# Patient Record
Sex: MALE | Race: OTHER | HISPANIC OR LATINO | Employment: PART TIME | URBAN - METROPOLITAN AREA
[De-identification: names, ages, dates, MRNs, and addresses within clinical notes are randomized per-mention and may not be internally consistent; named-entity substitution may affect disease eponyms.]

---

## 2018-10-30 ENCOUNTER — HOSPITAL ENCOUNTER (EMERGENCY)
Facility: HOSPITAL | Age: 28
Discharge: HOME | End: 2018-10-30
Attending: EMERGENCY MEDICINE | Admitting: EMERGENCY MEDICINE

## 2018-10-30 ENCOUNTER — APPOINTMENT (EMERGENCY)
Dept: RADIOLOGY | Facility: HOSPITAL | Age: 28
End: 2018-10-30

## 2018-10-30 VITALS
SYSTOLIC BLOOD PRESSURE: 130 MMHG | RESPIRATION RATE: 19 BRPM | OXYGEN SATURATION: 99 % | HEART RATE: 73 BPM | DIASTOLIC BLOOD PRESSURE: 68 MMHG | TEMPERATURE: 99.5 F | HEIGHT: 73 IN | WEIGHT: 203.6 LBS | BODY MASS INDEX: 26.98 KG/M2

## 2018-10-30 DIAGNOSIS — E87.6 HYPOKALEMIA: ICD-10-CM

## 2018-10-30 DIAGNOSIS — R50.9 FEVER: Primary | ICD-10-CM

## 2018-10-30 DIAGNOSIS — D69.6 THROMBOCYTOPENIA (HCC): ICD-10-CM

## 2018-10-30 DIAGNOSIS — B34.9 VIRAL SYNDROME: ICD-10-CM

## 2018-10-30 LAB
ANION GAP SERPL CALCULATED.3IONS-SCNC: 9 MMOL/L (ref 5–14)
BACTERIA UR QL AUTO: ABNORMAL /HPF
BASOPHILS # BLD AUTO: 0 THOUSANDS/ΜL (ref 0–0.1)
BASOPHILS NFR BLD AUTO: 1 % (ref 0–1)
BILIRUB UR QL STRIP: NEGATIVE
BUN SERPL-MCNC: 11 MG/DL (ref 5–25)
CALCIUM SERPL-MCNC: 8.3 MG/DL (ref 8.4–10.2)
CHLORIDE SERPL-SCNC: 97 MMOL/L (ref 97–108)
CLARITY UR: CLEAR
CO2 SERPL-SCNC: 26 MMOL/L (ref 22–30)
COLOR UR: ABNORMAL
CREAT SERPL-MCNC: 0.98 MG/DL (ref 0.7–1.5)
EOSINOPHIL # BLD AUTO: 0 THOUSAND/ΜL (ref 0–0.4)
EOSINOPHIL NFR BLD AUTO: 0 % (ref 0–6)
ERYTHROCYTE [DISTWIDTH] IN BLOOD BY AUTOMATED COUNT: 13 %
FLUAV + FLUBV RNA ISLT NAA+PROBE: NOT DETECTED
FLUAV + FLUBV RNA ISLT NAA+PROBE: NOT DETECTED
GFR SERPL CREATININE-BSD FRML MDRD: 123 ML/MIN/1.73SQ M
GLUCOSE SERPL-MCNC: 126 MG/DL (ref 70–99)
GLUCOSE SERPL-MCNC: 128 MG/DL (ref 70–99)
GLUCOSE UR STRIP-MCNC: NEGATIVE MG/DL
HCT VFR BLD AUTO: 50.5 % (ref 41–53)
HGB BLD-MCNC: 17.2 G/DL (ref 13.5–17.5)
HGB UR QL STRIP.AUTO: NEGATIVE
KETONES UR STRIP-MCNC: ABNORMAL MG/DL
LEUKOCYTE ESTERASE UR QL STRIP: NEGATIVE
LYMPHOCYTES # BLD AUTO: 0.6 THOUSANDS/ΜL (ref 0.5–4)
LYMPHOCYTES NFR BLD AUTO: 20 % (ref 20–50)
MAGNESIUM SERPL-MCNC: 1.7 MG/DL (ref 1.6–2.3)
MCH RBC QN AUTO: 30.6 PG (ref 26–34)
MCHC RBC AUTO-ENTMCNC: 34.1 G/DL (ref 31–36)
MCV RBC AUTO: 90 FL (ref 80–100)
MONOCYTES # BLD AUTO: 0.2 THOUSAND/ΜL (ref 0.2–0.9)
MONOCYTES NFR BLD AUTO: 8 % (ref 1–10)
MUCOUS THREADS UR QL AUTO: ABNORMAL
NEUTROPHILS # BLD AUTO: 2.3 THOUSANDS/ΜL (ref 1.8–7.8)
NEUTS SEG NFR BLD AUTO: 71 % (ref 45–65)
NITRITE UR QL STRIP: NEGATIVE
NON-SQ EPI CELLS URNS QL MICRO: ABNORMAL /HPF
PH UR STRIP.AUTO: 6 [PH] (ref 4.5–8)
PLATELET # BLD AUTO: 86 THOUSANDS/UL (ref 150–450)
PLATELET BLD QL SMEAR: ABNORMAL
PMV BLD AUTO: 11.1 FL (ref 8.9–12.7)
POTASSIUM SERPL-SCNC: 3.2 MMOL/L (ref 3.6–5)
PROT UR STRIP-MCNC: ABNORMAL MG/DL
RBC # BLD AUTO: 5.61 MILLION/UL (ref 4.5–5.9)
RBC #/AREA URNS AUTO: ABNORMAL /HPF
RBC MORPH BLD: NORMAL
SODIUM SERPL-SCNC: 132 MMOL/L (ref 137–147)
SP GR UR STRIP.AUTO: 1.02 (ref 1–1.04)
UROBILINOGEN UA: 1 MG/DL
WBC # BLD AUTO: 3.2 THOUSAND/UL (ref 4.5–11)
WBC #/AREA URNS AUTO: ABNORMAL /HPF

## 2018-10-30 PROCEDURE — 81001 URINALYSIS AUTO W/SCOPE: CPT | Performed by: EMERGENCY MEDICINE

## 2018-10-30 PROCEDURE — 36415 COLL VENOUS BLD VENIPUNCTURE: CPT | Performed by: EMERGENCY MEDICINE

## 2018-10-30 PROCEDURE — 80048 BASIC METABOLIC PNL TOTAL CA: CPT | Performed by: EMERGENCY MEDICINE

## 2018-10-30 PROCEDURE — 85025 COMPLETE CBC W/AUTO DIFF WBC: CPT | Performed by: EMERGENCY MEDICINE

## 2018-10-30 PROCEDURE — 82948 REAGENT STRIP/BLOOD GLUCOSE: CPT

## 2018-10-30 PROCEDURE — 71046 X-RAY EXAM CHEST 2 VIEWS: CPT

## 2018-10-30 PROCEDURE — 83735 ASSAY OF MAGNESIUM: CPT | Performed by: EMERGENCY MEDICINE

## 2018-10-30 PROCEDURE — 96374 THER/PROPH/DIAG INJ IV PUSH: CPT

## 2018-10-30 PROCEDURE — 99284 EMERGENCY DEPT VISIT MOD MDM: CPT

## 2018-10-30 PROCEDURE — 87502 INFLUENZA DNA AMP PROBE: CPT | Performed by: EMERGENCY MEDICINE

## 2018-10-30 PROCEDURE — 96361 HYDRATE IV INFUSION ADD-ON: CPT

## 2018-10-30 RX ORDER — POTASSIUM CHLORIDE 750 MG/1
20 TABLET, EXTENDED RELEASE ORAL ONCE
Status: COMPLETED | OUTPATIENT
Start: 2018-10-30 | End: 2018-10-30

## 2018-10-30 RX ORDER — IBUPROFEN 600 MG/1
600 TABLET ORAL ONCE
Status: DISCONTINUED | OUTPATIENT
Start: 2018-10-30 | End: 2018-10-30

## 2018-10-30 RX ORDER — POTASSIUM CHLORIDE 750 MG/1
TABLET, EXTENDED RELEASE ORAL
Status: COMPLETED
Start: 2018-10-30 | End: 2018-10-30

## 2018-10-30 RX ORDER — KETOROLAC TROMETHAMINE 30 MG/ML
INJECTION, SOLUTION INTRAMUSCULAR; INTRAVENOUS
Status: COMPLETED
Start: 2018-10-30 | End: 2018-10-30

## 2018-10-30 RX ORDER — ACETAMINOPHEN 325 MG/1
650 TABLET ORAL ONCE
Status: COMPLETED | OUTPATIENT
Start: 2018-10-30 | End: 2018-10-30

## 2018-10-30 RX ORDER — KETOROLAC TROMETHAMINE 30 MG/ML
30 INJECTION, SOLUTION INTRAMUSCULAR; INTRAVENOUS ONCE
Status: DISCONTINUED | OUTPATIENT
Start: 2018-10-30 | End: 2018-10-30

## 2018-10-30 RX ORDER — KETOROLAC TROMETHAMINE 30 MG/ML
30 INJECTION, SOLUTION INTRAMUSCULAR; INTRAVENOUS ONCE
Status: COMPLETED | OUTPATIENT
Start: 2018-10-30 | End: 2018-10-30

## 2018-10-30 RX ORDER — ACETAMINOPHEN 325 MG/1
TABLET ORAL
Status: COMPLETED
Start: 2018-10-30 | End: 2018-10-30

## 2018-10-30 RX ADMIN — POTASSIUM CHLORIDE 20 MEQ: 750 TABLET, EXTENDED RELEASE ORAL at 22:18

## 2018-10-30 RX ADMIN — KETOROLAC TROMETHAMINE 30 MG: 30 INJECTION, SOLUTION INTRAMUSCULAR; INTRAVENOUS at 21:18

## 2018-10-30 RX ADMIN — ACETAMINOPHEN 650 MG: 325 TABLET ORAL at 21:17

## 2018-10-30 RX ADMIN — SODIUM CHLORIDE 1000 ML: 9 INJECTION, SOLUTION INTRAVENOUS at 21:24

## 2018-10-30 RX ADMIN — POTASSIUM CHLORIDE 20 MEQ: 10 TABLET, EXTENDED RELEASE ORAL at 22:18

## 2018-10-31 NOTE — ED PROVIDER NOTES
History  Chief Complaint   Patient presents with    Fatigue     patient states that he feels weakness in bilateral legs, started yesterday, patient states that he also feels like his mouth is dry, like he is dehydrated, hasn't been able to eat x2 days, drinking liquids  no meds taken  denies ill contact     Patient is a 71-year-old male otherwise healthy coming in today for 2 days viral-like syndrome  Patient states he started with a nonproductive cough  Now has been having subjective fevers, myalgias, weakness and paresthesias throughout the bilateral lower extremities  He has no headache, confusion, slurred speech  He has no weakness or paresthesias throughout the bilateral upper extremities  He has no recent travel, sick contacts, he did not receive the flu shot  He has never had this before  He states he does not have an appetite  He has been tolerating Gatorade and liquids without any difficulty  He has no nausea, vomiting or diarrhea          History provided by:  Patient   used: No    Fatigue   Severity:  Mild  Onset quality:  Gradual  Duration:  2 days  Timing:  Intermittent  Progression:  Waxing and waning  Chronicity:  New  Context: dehydration    Context: not alcohol use, not allergies, not change in medication, not decreased sleep, not drug use, not increased activity, not pinched nerve, not recent infection, not stress and not urinary tract infection    Worsened by:  Nothing  Ineffective treatments:  None tried  Associated symptoms: anorexia, fever and myalgias    Associated symptoms: no abdominal pain, no aphasia, no arthralgias, no ataxia, no chest pain, no cough, no diarrhea, no difficulty walking, no dizziness, no drooling, no dysphagia, no dysuria, no falls, no foul-smelling urine, no frequency, no headaches, no hematochezia, no lethargy, no loss of consciousness, no melena, no nausea, no near-syncope, no seizures, no sensory-motor deficit, no shortness of breath, no stroke symptoms, no syncope, no urgency, no vision change and no vomiting    Risk factors: no anemia, no congestive heart failure, no coronary artery disease, no diabetes, no excessive menstruation, no family hx of stroke, no heart disease, no neurologic disease, no new medications and no recent stressors        None       Past Medical History:   Diagnosis Date    Asthma        History reviewed  No pertinent surgical history  History reviewed  No pertinent family history  I have reviewed and agree with the history as documented  Social History   Substance Use Topics    Smoking status: Current Every Day Smoker     Packs/day: 0 25     Types: Cigarettes    Smokeless tobacco: Never Used    Alcohol use Yes      Comment: weekends         Review of Systems   Constitutional: Positive for fatigue and fever  Negative for diaphoresis  HENT: Negative for drooling, ear pain and sore throat  Eyes: Negative for visual disturbance  Respiratory: Negative for cough, chest tightness and shortness of breath  Cardiovascular: Negative for chest pain, palpitations, syncope and near-syncope  Gastrointestinal: Positive for anorexia  Negative for abdominal pain, diarrhea, dysphagia, hematochezia, melena, nausea and vomiting  Genitourinary: Negative for difficulty urinating, dysuria, frequency and urgency  Musculoskeletal: Positive for myalgias  Negative for arthralgias, back pain, falls and neck pain  Skin: Negative for rash  Neurological: Negative for dizziness, seizures, loss of consciousness, weakness and headaches  Psychiatric/Behavioral: Negative for confusion  All other systems reviewed and are negative  Physical Exam  Physical Exam   Constitutional: He is oriented to person, place, and time  He appears well-developed and well-nourished  No distress  HENT:   Head: Normocephalic and atraumatic  Mouth/Throat: Oropharynx is clear and moist    Patient is maintaining airway maintaining secretions  Uvula midline without edema  Tongue midline  No pharyngeal exudates  Eyes: Pupils are equal, round, and reactive to light  Conjunctivae and EOM are normal    Neck: Normal range of motion  Neck supple  No cervical adenopathy   Cardiovascular: Regular rhythm, normal heart sounds and intact distal pulses  Tachycardia present  No murmur heard  Pulmonary/Chest: Effort normal and breath sounds normal  No stridor  No respiratory distress  Abdominal: Soft  Bowel sounds are normal  He exhibits no distension and no mass  There is no tenderness  There is no rebound and no guarding  Musculoskeletal: Normal range of motion  He exhibits no edema  Patient has full active range of motion throughout the bilateral lower extremities  Neurological: He is alert and oriented to person, place, and time  He displays normal reflexes  No cranial nerve deficit or sensory deficit  He exhibits normal muscle tone  Coordination normal    Reflexes 2+ at L4 and S1 bilaterally   Skin: Skin is warm  Capillary refill takes less than 2 seconds  He is not diaphoretic  Nursing note and vitals reviewed        Vital Signs  ED Triage Vitals   Temperature Pulse Respirations Blood Pressure SpO2   10/30/18 2047 10/30/18 2047 10/30/18 2047 10/30/18 2047 10/30/18 2047   (!) 101 9 °F (38 8 °C) 101 18 113/75 98 %      Temp Source Heart Rate Source Patient Position - Orthostatic VS BP Location FiO2 (%)   10/30/18 2047 10/30/18 2047 10/30/18 2047 10/30/18 2047 --   Tympanic Monitor Sitting Left arm       Pain Score       10/30/18 2117       9           Vitals:    10/30/18 2047 10/30/18 2215 10/30/18 2251   BP: 113/75  130/68   Pulse: 101 90 73   Patient Position - Orthostatic VS: Sitting  Lying       Visual Acuity      ED Medications  Medications   acetaminophen (TYLENOL) tablet 650 mg (650 mg Oral Given 10/30/18 2117)   sodium chloride 0 9 % bolus 1,000 mL (0 mL Intravenous Stopped 10/30/18 2215)   ketorolac (TORADOL) injection 30 mg (30 mg Intravenous Given 10/30/18 2118)   potassium chloride (K-DUR,KLOR-CON) CR tablet 20 mEq (20 mEq Oral Given 10/30/18 2218)       Diagnostic Studies  Results Reviewed     Procedure Component Value Units Date/Time    Rapid Flu-Viral RNA amplification [facility] HEART ONLY) [[id number]]  (Normal) Collected:  10/30/18 2122    Lab Status:  Final result Specimen:  Nares from Nose Updated:  10/30/18 2158     INFLUENZA A AMPLIFIED RNA Not Detected     INFLUENZA B AMPLIFIED Not Detected    Urine Microscopic [[id number]]  (Abnormal) Collected:  10/30/18 2122    Lab Status:  Final result Specimen:  Urine from Urine, Clean Catch Updated:  10/30/18 2154     RBC, UA 0-1 (A) /hpf      WBC, UA 2-4 (A) /hpf      Epithelial Cells Occasional /hpf      Bacteria, UA Occasional /hpf      MUCOUS THREADS Occasional (A)    Basic metabolic panel [[id number]]  (Abnormal) Collected:  10/30/18 2121    Lab Status:  Final result Specimen:  Blood from Arm, Left Updated:  10/30/18 2149     Sodium 132 (L) mmol/L      Potassium 3 2 (L) mmol/L      Chloride 97 mmol/L      CO2 26 mmol/L      ANION GAP 9 mmol/L      BUN 11 mg/dL      Creatinine 0 98 mg/dL      Glucose 126 (H) mg/dL      Calcium 8 3 (L) mg/dL      eGFR 123 ml/min/1 73sq m     Narrative:         National Kidney Disease Education Program recommendations are as follows:  GFR calculation is accurate only with a steady state creatinine  Chronic Kidney disease less than 60 ml/min/1 73 sq  meters  Kidney failure less than 15 ml/min/1 73 sq  meters      CBC and differential [[id number]]  (Abnormal) Collected:  10/30/18 2121    Lab Status:  Final result Specimen:  Blood from Arm, Left Updated:  10/30/18 2149     WBC 3 20 (L) Thousand/uL      RBC 5 61 Million/uL      Hemoglobin 17 2 g/dL      Hematocrit 50 5 %      MCV 90 fL      MCH 30 6 pg      MCHC 34 1 g/dL      RDW 13 0 %      MPV 11 1 fL      Platelets 86 (L) Thousands/uL      Neutrophils Relative 71 (H) %      Lymphocytes Relative 20 %      Monocytes Relative 8 %      Eosinophils Relative 0 %      Basophils Relative 1 %      Neutrophils Absolute 2 30 Thousands/µL      Lymphocytes Absolute 0 60 Thousands/µL      Monocytes Absolute 0 20 Thousand/µL      Eosinophils Absolute 0 00 Thousand/µL      Basophils Absolute 0 00 Thousands/µL     Magnesium [[id number]]  (Normal) Collected:  10/30/18 2121    Lab Status:  Final result Specimen:  Blood from Arm, Left Updated:  10/30/18 2148     Magnesium 1 7 mg/dL     UA w Reflex to Microscopic [[id number]]  (Abnormal) Collected:  10/30/18 2122    Lab Status:  Final result Specimen:  Urine from Urine, Clean Catch Updated:  10/30/18 2142     Color, UA Trudi (A)     Clarity, UA Clear     Specific [place], UA 1 020     pH, UA 6 0     Leukocytes, UA Negative     Nitrite, UA Negative     Protein,  (2+) (A) mg/dl      Glucose, UA Negative mg/dl      Ketones, UA 50 (2+) (A) mg/dl      Bilirubin, UA Negative     Blood, UA Negative     UROBILINOGEN UA 1 0 mg/dL     Fingerstick Glucose (POCT) [[id number]]  (Abnormal) Collected:  10/30/18 2053    Lab Status:  Final result Updated:  10/30/18 2104     POC Glucose 128 (H) mg/dl                  XR chest 2 views    (Results Pending)              Procedures  Procedures       Phone Contacts  ED Phone Contact    ED Course                               MDM  Number of Diagnoses or Management Options  Fever:   Hypokalemia:   Thrombocytopenia ([facility] [address] ):   Viral syndrome:   Diagnosis management comments:   Patient is a 81-year-old male with subjective fevers and does have a fever here  He is complaining of myalgias as well as tachycardia  Will obtain labs, flu swab as well as give IV fluids as well as Tylenol and Toradol  Patient is neurologically intact with no focal deficits  10:08 PM  Patient with noted hypokalemia, thrombocytopenia and neutropenia  Patient remains febrile however appears very exam to the breaking his temperature/fever  Remains hemodynamically stable    Patient updated on labs including thrombocytopenia as well as hypokalemia  Will replace K  I discussed with him need for chest x-ray as well as need for follow-up with PCP and repeat laboratory in the next few days  Patient denies any exposure to HIV or hepatitis  10:54 PM  Patient afebrile hemodynamically stable  We discussed with him the importance of following up with a PCP for repeat lab work  He is asking for IV out to go home  He tolerated p o  well  Remains in no apparent distress on my exam   Flu testing negative  Portions of the record may have been created with voice recognition software  Occasional wrong word or "sound a like" substitutions may have occurred due to the inherent limitations of voice recognition software  Read the chart carefully and recognize, using context, where substitutions have occurred  Amount and/or Complexity of Data Reviewed  Clinical lab tests: ordered and reviewed  Tests in the radiology section of CPT®: reviewed and ordered  Tests in the medicine section of CPT®: reviewed and ordered  Independent visualization of images, tracings, or specimens: yes (No focal consolidation  Cardiac silhouette stable  No pneumothoraces)      CritCare Time    Disposition  Final diagnoses:   Fever   Thrombocytopenia ([address] )   Hypokalemia   Viral syndrome     Time reflects when diagnosis was documented in both MDM as applicable and the Disposition within this note     Time User Action Codes Description Comment    10/30/2018 10:11 PM Bendjessica, Chelle L Add [R50 9] Fever     10/30/2018 10:11 PM Bendjessica Chelle L Add [D69 6] Thrombocytopenia ([address] )     10/30/2018 10:11 PM BendEstrella lopez [place] Add [E87 6] Hypokalemia     10/30/2018 10:27 PM BendMehul lopez L Add [B34 9] Viral syndrome       ED Disposition     ED Disposition Condition Comment    Discharge  Corey Colon discharge to home/self care      Condition at discharge: Stable        Follow-up Information     Follow up With Specialties Details Why Contact Info [address] Schedule an appointment as soon as possible for a visit in 2 days  [address]  [facility] [address]             Patient's Medications    No medications on file     No discharge procedures on file      ED Provider  Electronically Signed by           Augustina Jose DO  10/30/18 7759

## 2018-10-31 NOTE — DISCHARGE INSTRUCTIONS
You must follow up with family doctor and have your labs repeated  You have low platelets which could be due to virus however this needs to be followed    Fever in [facility]:   What is a fever? A fever is an increase in your body temperature  Normal body temperature is 98 6°F (37°C)  Fever is generally defined as greater than 100 4°F (38°C)  What are common causes of a fever? The cause of your fever may not be known  This is called fever of unknown origin  It occurs when you have a fever above 100  9? F (38 3°C) for 3 weeks or more  The following are common causes of fever:  · An infection caused by a virus or bacteria    · An inflammatory disorder, such as arthritis    · A brain infection or injury    · Alcohol or illegal drug use, or withdrawal  What other signs and symptoms may I have? · Chills and shivers     · Muscle stiffness    · Weight loss    · Night sweats    · Fever that comes and goes  · Fever that is higher in the morning  How is the cause of a fever diagnosed? Your healthcare provider will ask when your fever began and how high it was  He or she will ask about other symptoms and examine you for signs of infection  He or she will feel your neck for lumps and listen to your heart and lungs  Tell your provider if you recently had surgery or an infection  Tell him or her if you have any medical conditions, such as diabetes or arthritis  You may also need blood or urine tests to check for infection  Ask about other tests you may need if blood and urine tests do not explain the cause of your fever  How is a fever treated? You may need any of the following, depending on the cause of your fever:  · NSAIDs , such as ibuprofen, help decrease swelling, pain, and fever  This medicine is available with or without a doctor's order  NSAIDs can cause stomach bleeding or kidney problems in certain people  If you take blood thinner medicine, always ask if NSAIDs are safe for you   Always read the medicine label and follow directions  Do not give these medicines to children under 10months of age without direction from your child's healthcare provider  · Acetaminophen  decreases pain and fever  It is available without a doctor's order  Ask how much to take and how often to take it  Follow directions  Read the labels of all other medicines you are using to see if they also contain acetaminophen, or ask your doctor or pharmacist  Acetaminophen can cause liver damage if not taken correctly  Do not use more than 4 grams (4,000 milligrams) total of acetaminophen in one day  · Antibiotics  may be given if you have an infection caused by bacteria  What can I do to be more comfortable while I have a fever? · Drink more liquids as directed  A fever makes you sweat  This can increase your risk for dehydration  Liquids can help prevent dehydration  ¨ Drink at least 6 to 8 eight-ounce cups of clear liquids each day  Drink water, juice, or broth  Do not drink sports drinks  They may contain caffeine  ¨ Ask your healthcare provider if you should drink an oral rehydration solution (ORS)  An ORS has the right amounts of water, salts, and sugar you need to replace body fluids  · Dress in lightweight clothes  Shivers may be a sign that your fever is rising  Do not put extra blankets or clothes on  This may cause your fever to rise even higher  Dress in light, comfortable clothing  Use a lightweight blanket or sheet when you sleep  Change your clothes, blanket, or sheets if they get wet  · Cool yourself safely  Take a bath in cool or lukewarm water  Use an ice pack wrapped in a small towel or wet a washcloth with cool water  Place the ice pack or wet washcloth on your forehead or the back of your neck  When should I seek immediate care? · Your fever does not go away or gets worse even after treatment  · You have a stiff neck and a bad headache  · You are confused   You may not be able to think clearly or remember things like you normally do  · Your heart beats faster than usual even after treatment  · You have shortness of breath or chest pain when you breathe  · You urinate small amounts or not at all  · Your skin, lips, or nails turn blue  When should I contact my healthcare provider? · You have abdominal pain or feel bloated  · You have nausea or are vomiting  · You have pain or burning when you urinate, or you have pain in your back  · You have questions or concerns about your condition or care  CARE AGREEMENT:   You have the right to help plan your care  Learn about your health condition and how it may be treated  Discuss treatment options with your caregivers to decide what care you want to receive  You always have the right to refuse treatment  The above information is an  only  It is not intended as medical advice for individual conditions or treatments  Talk to your doctor, nurse or pharmacist before following any medical regimen to see if it is safe and effective for you  © 2017 [address] Information is for End User's use only and may not be sold, redistributed or otherwise used for commercial purposes  All illustrations and images included in CareNotes® are the copyrighted property of [employer]  or [employer]  Hypokalemia   WHAT YOU NEED TO KNOW:   Hypokalemia is a low level of potassium in your blood  Potassium helps control how your muscles, heart, and digestive system work  Hypokalemia occurs when your body loses too much potassium or does not absorb enough from food  DISCHARGE INSTRUCTIONS:   Seek care immediately if:   · You cannot move your arm or leg  · You have a fast or irregular heartbeat  · You are too tired or weak to stand up  Contact your healthcare provider if:   · You are vomiting, or you have diarrhea  · You have numbness or tingling in your arms or legs      · Your symptoms do not go away or they get worse  · You have questions or concerns about your condition or care  Medicines:   · Potassium  will be given to bring your potassium levels back to normal     · Take your medicine as directed  Contact your healthcare provider if you think your medicine is not helping or if you have side effects  Tell him of her if you are allergic to any medicine  Keep a list of the medicines, vitamins, and herbs you take  Include the amounts, and when and why you take them  Bring the list or the pill bottles to follow-up visits  Carry your medicine list with you in case of an emergency  Eat foods that are high in potassium:  Foods that are high in potassium include bananas, oranges, tomatoes, potatoes, and avocado  Bond beans, turkey, salmon, lean beef, yogurt, and milk are also high in potassium  Ask your healthcare provider or dietitian for more information about foods that are high in potassium  Follow up with your healthcare provider as directed:  Write down your questions so you remember to ask them during your visits  © 2017 [address] [facility] Information is for End User's use only and may not be sold, redistributed or otherwise used for commercial purposes  All illustrations and images included in CareNotes® are the copyrighted property of [employer] A M , Inc  or [employer]  The above information is an  only  It is not intended as medical advice for individual conditions or treatments  Talk to your doctor, nurse or pharmacist before following any medical regimen to see if it is safe and effective for you  Thrombocytopenia   WHAT YOU NEED TO KNOW:   Thrombocytopenia occurs when your body does not have enough platelets  Platelets are cells that help your blood clot  Your body may not be making enough platelets, or it may be destroying too many platelets  When platelets become low, your risk for bleeding increases   Severe bleeding may become life-threatening  DISCHARGE INSTRUCTIONS:   Call 911 for any of the following:   · You have chest pain, tightness, or heaviness that spreads to your shoulders, arms, jaw, neck, or back  · You have weakness on one side of your body, a severe headache, difficulty speaking, or a change in vision  Seek care immediately if:   · You have bleeding that does not stop after you elevate and place pressure on the area  · You vomit blood or material that looks like coffee grounds  · Your arm or leg feels warm, tender, and painful  It may look swollen and red  · You suddenly feel lightheaded, dizzy, or weak  Contact your healthcare provider if:   · You have bleeding from your gums, mouth, or nose  · You have irregular or heavy menstrual bleeding  · You have blood in your urine or bowel movement  · You have more bruises or small red or purple spots on your skin  · You have questions or concerns about your condition or care  Medicines:   · Medicines  can help increase platelet production and prevent bleeding  · Take your medicine as directed  Contact your healthcare provider if you think your medicine is not helping or if you have side effects  Tell him or her if you are allergic to any medicine  Keep a list of the medicines, vitamins, and herbs you take  Include the amounts, and when and why you take them  Bring the list or the pill bottles to follow-up visits  Carry your medicine list with you in case of an emergency  Self-care to help prevent bleeding:  Examine your skin for minor bumps, scrapes, and cuts  These injuries can increase your risk for bleeding that can become life-threatening  · Use caution with skin and mouth care  Use a soft washcloth and a soft toothbrush  This can keep your skin and gums from bleeding  Keep your nails trimmed  If you shave, use an electric shaver  · Do not strain when you have a bowel movement    This can increase pressure in your brain and could cause bleeding  Ask your healthcare provider about a stool softener or laxative if you are constipated  Do not use enemas or suppositories  · Use a cool mist humidifier  to increase moisture in your home  This may help prevent coughing or nosebleeds  Coughing can increase pressure in your brain and cause bleeding  · Avoid activities that may cause scratches or bruises  Wear shoes or slippers to protect your feet from injury  Ask which activities are safe for you  · Do not take aspirin or NSAIDs  These medicines can cause you to bleed and bruise more easily  Wear medical alert identification:  Wear a medical alert bracelet or carry a card that says you have thrombocytopenia  Ask where to get these items  Follow up with your healthcare provider as directed: You will need to return for more blood tests  Write down your questions so you remember to ask them during your visits  © 2017 [address]  Information is for End User's use only and may not be sold, redistributed or otherwise used for commercial purposes  All illustrations and images included in CareNotes® are the copyrighted property of A [employer] A M , Inc  or Alcides Treviño  The above information is an  only  It is not intended as medical advice for individual conditions or treatments  Talk to your doctor, nurse or pharmacist before following any medical regimen to see if it is safe and effective for you

## 2021-02-19 ENCOUNTER — APPOINTMENT (EMERGENCY)
Dept: RADIOLOGY | Facility: HOSPITAL | Age: 31
End: 2021-02-19

## 2021-02-19 ENCOUNTER — HOSPITAL ENCOUNTER (EMERGENCY)
Facility: HOSPITAL | Age: 31
Discharge: HOME | End: 2021-02-19
Attending: EMERGENCY MEDICINE | Admitting: EMERGENCY MEDICINE

## 2021-02-19 VITALS
RESPIRATION RATE: 18 BRPM | HEART RATE: 69 BPM | DIASTOLIC BLOOD PRESSURE: 88 MMHG | TEMPERATURE: 97.9 F | SYSTOLIC BLOOD PRESSURE: 143 MMHG | OXYGEN SATURATION: 100 % | WEIGHT: 215.8 LBS | BODY MASS INDEX: 28.47 KG/M2

## 2021-02-19 DIAGNOSIS — Z20.822 ENCOUNTER FOR LABORATORY TESTING FOR COVID-19 VIRUS: ICD-10-CM

## 2021-02-19 DIAGNOSIS — B34.9 VIRAL SYNDROME: Primary | ICD-10-CM

## 2021-02-19 LAB
FLUAV RNA RESP QL NAA+PROBE: NEGATIVE
FLUBV RNA RESP QL NAA+PROBE: NEGATIVE
RSV RNA RESP QL NAA+PROBE: NEGATIVE
SARS-COV-2 RNA RESP QL NAA+PROBE: NEGATIVE

## 2021-02-19 PROCEDURE — 99283 EMERGENCY DEPT VISIT LOW MDM: CPT | Performed by: PHYSICIAN ASSISTANT

## 2021-02-19 PROCEDURE — 99283 EMERGENCY DEPT VISIT LOW MDM: CPT

## 2021-02-19 PROCEDURE — 71045 X-RAY EXAM CHEST 1 VIEW: CPT

## 2021-02-19 PROCEDURE — 0241U HB NFCT DS VIR RESP RNA 4 TRGT: CPT | Performed by: PHYSICIAN ASSISTANT

## 2021-02-19 RX ORDER — ALBUTEROL SULFATE 90 UG/1
2 AEROSOL, METERED RESPIRATORY (INHALATION) EVERY 4 HOURS PRN
Qty: 1 INHALER | Refills: 0 | Status: SHIPPED | OUTPATIENT
Start: 2021-02-19

## 2021-02-19 RX ORDER — ALBUTEROL SULFATE 90 UG/1
2 AEROSOL, METERED RESPIRATORY (INHALATION) EVERY 4 HOURS PRN
Qty: 1 INHALER | Refills: 0 | Status: SHIPPED | OUTPATIENT
Start: 2021-02-19 | End: 2021-02-19 | Stop reason: SDUPTHER

## 2021-02-19 NOTE — ED PROVIDER NOTES
History  Chief Complaint   Patient presents with    Cough     productive cough and loss of taste since yesterday, employer requires a Covid test per pt     Pt with cough and congestion and loss of smell and taste for several days          None       Past Medical History:   Diagnosis Date    Asthma        History reviewed  No pertinent surgical history  History reviewed  No pertinent family history  I have reviewed and agree with the history as documented  E-Cigarette/Vaping     E-Cigarette/Vaping Substances     Social History     Tobacco Use    Smoking status: Current Every Day Smoker     Packs/day: 0 25     Types: Cigarettes    Smokeless tobacco: Never Used   Substance Use Topics    Alcohol use: Yes     Comment: weekends     Drug use: Yes     Types: Marijuana     Comment: daily       Review of Systems   Constitutional: Negative  HENT: Positive for congestion  Eyes: Negative  Respiratory: Positive for cough  Cardiovascular: Negative  Gastrointestinal: Negative  Endocrine: Negative  Genitourinary: Negative  Musculoskeletal: Negative  Skin: Negative  Allergic/Immunologic: Negative  Neurological: Negative  Hematological: Negative  All other systems reviewed and are negative  Physical Exam  Physical Exam  Vitals signs and nursing note reviewed  Constitutional:       Appearance: Normal appearance  HENT:      Head: Normocephalic and atraumatic  Right Ear: Tympanic membrane, ear canal and external ear normal       Left Ear: Tympanic membrane, ear canal and external ear normal       Nose: Nose normal       Mouth/Throat:      Mouth: Mucous membranes are moist       Pharynx: Oropharynx is clear  Eyes:      Extraocular Movements: Extraocular movements intact  Conjunctiva/sclera: Conjunctivae normal       Pupils: Pupils are equal, round, and reactive to light  Neck:      Musculoskeletal: Normal range of motion and neck supple     Cardiovascular:      Rate and Rhythm: Normal rate and regular rhythm  Pulses: Normal pulses  Heart sounds: Normal heart sounds  Pulmonary:      Effort: Pulmonary effort is normal       Comments: Minor coarse sounds   Abdominal:      General: Abdomen is flat  Bowel sounds are normal       Palpations: Abdomen is soft  Musculoskeletal: Normal range of motion  Skin:     General: Skin is warm  Capillary Refill: Capillary refill takes less than 2 seconds  Neurological:      General: No focal deficit present  Mental Status: He is alert and oriented to person, place, and time  Psychiatric:         Mood and Affect: Mood normal          Behavior: Behavior normal          Vital Signs  ED Triage Vitals [02/19/21 0807]   Temperature Pulse Respirations Blood Pressure SpO2   97 9 °F (36 6 °C) 69 18 143/88 100 %      Temp Source Heart Rate Source Patient Position - Orthostatic VS BP Location FiO2 (%)   Tympanic Monitor Sitting Left arm --      Pain Score       --           Vitals:    02/19/21 0807   BP: 143/88   Pulse: 69   Patient Position - Orthostatic VS: Sitting         Visual Acuity      ED Medications  Medications - No data to display    Diagnostic Studies  Results Reviewed     Procedure Component Value Units Date/Time    COVID19, Influenza A/B, RSV PCR, [facility] [[id number]]  (Normal) Collected: 02/19/21 0824    Lab Status: Final result Specimen: Nares from Nose Updated: 02/19/21 0953     SARS-CoV-2 Negative     INFLUENZA A PCR Negative     INFLUENZA B PCR Negative     RSV PCR Negative    Narrative: This test has been authorized by FDA under an EUA (Emergency Use Assay) for use by authorized laboratories  Clinical caution and judgement should be used with the interpretation of these results with consideration of the clinical impression and other laboratory testing  Testing reported as "Positive" or "Negative" has been proven to be accurate according to standard laboratory validation requirements    All testing is performed with control materials showing appropriate reactivity at standard intervals  XR chest 1 view portable   Final Result by Tanika Evans MD (02/19 1990)   No acute cardiopulmonary disease  Findings are stable            Workstation performed: [id number]                    Procedures  Procedures         ED Course                             SBIRT 20yo+      Most Recent Value   SBIRT (22 yo +)   In order to provide better care to our patients, we are screening all of our patients for alcohol and drug use  Would it be okay to ask you these screening questions? Yes Filed at: 02/19/2021 0857   Initial Alcohol Screen: US AUDIT-C    1  How often do you have a drink containing alcohol? 3 Filed at: 02/19/2021 0857   2  How many drinks containing alcohol do you have on a typical day you are drinking? 1 Filed at: 02/19/2021 0857   3a  Male UNDER 65: How often do you have five or more drinks on one occasion? 3 Filed at: 02/19/2021 0857   Audit-C Score  7 Filed at: 02/19/2021 5149   HIPOLITO: How many times in the past year have you    Used an illegal drug or used a prescription medication for non-medical reasons? Daily or Almost Daily Filed at: 02/19/2021 0857   DAST-10: In the past 12 months      1  Have you used drugs other than those required for medical reasons? 1 Filed at: 02/19/2021 0857   2  Do you use more than one drug at a time? 0 Filed at: 02/19/2021 9754                    MDM    Disposition  Final diagnoses:   Viral syndrome   Encounter for laboratory testing for COVID-19 virus     Time reflects when diagnosis was documented in both MDM as applicable and the Disposition within this note     Time User Action Codes Description Comment    2/19/2021  9:15 AM Luis Enrique Lights  Add [B34 9] Viral syndrome     2/19/2021  9:15 AM Evalina Acrjennifer Rebolledo   Add [Z20 822] Encounter for laboratory testing for COVID-19 virus       ED Disposition     ED Disposition Condition Date/Time Comment Discharge Stable Fri Feb 19, 2021  9:15 AM Corey Gilbert discharge to home/self care  Follow-up Information     Follow up With Specialties Details Why [address] Johnson MD Family Medicine   [address] [phone]            Discharge Medication List as of 2/19/2021  9:16 AM      CONTINUE these medications which have CHANGED    Details   albuterol (PROVENTIL HFA,VENTOLIN HFA) 90 mcg/act inhaler Inhale 2 puffs every 4 (four) hours as needed for wheezing, Starting Fri 2/19/2021, Print           No discharge procedures on file      PDMP Review     None          ED Provider  Electronically Signed by           Lucina Hamilton PA-C  02/19/21 9337

## 2021-02-19 NOTE — DISCHARGE INSTRUCTIONS

## 2021-05-02 ENCOUNTER — APPOINTMENT (EMERGENCY)
Dept: RADIOLOGY | Facility: HOSPITAL | Age: 31
End: 2021-05-02
Payer: COMMERCIAL

## 2021-05-02 ENCOUNTER — APPOINTMENT (EMERGENCY)
Dept: CT IMAGING | Facility: HOSPITAL | Age: 31
End: 2021-05-02
Payer: COMMERCIAL

## 2021-05-02 ENCOUNTER — HOSPITAL ENCOUNTER (EMERGENCY)
Facility: HOSPITAL | Age: 31
Discharge: HOME | End: 2021-05-02
Attending: EMERGENCY MEDICINE
Payer: COMMERCIAL

## 2021-05-02 VITALS
HEART RATE: 64 BPM | TEMPERATURE: 97.6 F | RESPIRATION RATE: 16 BRPM | SYSTOLIC BLOOD PRESSURE: 143 MMHG | DIASTOLIC BLOOD PRESSURE: 84 MMHG | OXYGEN SATURATION: 99 %

## 2021-05-02 DIAGNOSIS — S82.143A TIBIAL PLATEAU FRACTURE: Primary | ICD-10-CM

## 2021-05-02 LAB
ABO GROUP BLD: NORMAL
ANION GAP SERPL CALCULATED.3IONS-SCNC: 10 MMOL/L (ref 4–13)
APTT PPP: 34 SECONDS (ref 23–37)
BASOPHILS # BLD AUTO: 0.02 THOUSANDS/ΜL (ref 0–0.1)
BASOPHILS NFR BLD AUTO: 0 % (ref 0–1)
BLD GP AB SCN SERPL QL: NEGATIVE
BUN SERPL-MCNC: 9 MG/DL (ref 5–25)
CALCIUM SERPL-MCNC: 8.9 MG/DL (ref 8.3–10.1)
CHLORIDE SERPL-SCNC: 100 MMOL/L (ref 100–108)
CO2 SERPL-SCNC: 25 MMOL/L (ref 21–32)
CREAT SERPL-MCNC: 0.95 MG/DL (ref 0.6–1.3)
EOSINOPHIL # BLD AUTO: 0.05 THOUSAND/ΜL (ref 0–0.61)
EOSINOPHIL NFR BLD AUTO: 0 % (ref 0–6)
ERYTHROCYTE [DISTWIDTH] IN BLOOD BY AUTOMATED COUNT: 12.8 % (ref 11.6–15.1)
GFR SERPL CREATININE-BSD FRML MDRD: 125 ML/MIN/1.73SQ M
GLUCOSE SERPL-MCNC: 108 MG/DL (ref 65–140)
HCT VFR BLD AUTO: 46.1 % (ref 36.5–49.3)
HGB BLD-MCNC: 15.7 G/DL (ref 12–17)
IMM GRANULOCYTES # BLD AUTO: 0.05 THOUSAND/UL (ref 0–0.2)
IMM GRANULOCYTES NFR BLD AUTO: 0 % (ref 0–2)
INR PPP: 1.03 (ref 0.84–1.19)
LYMPHOCYTES # BLD AUTO: 1.05 THOUSANDS/ΜL (ref 0.6–4.47)
LYMPHOCYTES NFR BLD AUTO: 9 % (ref 14–44)
MCH RBC QN AUTO: 30.7 PG (ref 26.8–34.3)
MCHC RBC AUTO-ENTMCNC: 34.1 G/DL (ref 31.4–37.4)
MCV RBC AUTO: 90 FL (ref 82–98)
MONOCYTES # BLD AUTO: 0.91 THOUSAND/ΜL (ref 0.17–1.22)
MONOCYTES NFR BLD AUTO: 8 % (ref 4–12)
NEUTROPHILS # BLD AUTO: 9.28 THOUSANDS/ΜL (ref 1.85–7.62)
NEUTS SEG NFR BLD AUTO: 83 % (ref 43–75)
NRBC BLD AUTO-RTO: 0 /100 WBCS
PLATELET # BLD AUTO: 162 THOUSANDS/UL (ref 149–390)
PMV BLD AUTO: 11.6 FL (ref 8.9–12.7)
POTASSIUM SERPL-SCNC: 3.8 MMOL/L (ref 3.5–5.3)
PROTHROMBIN TIME: 13.3 SECONDS (ref 11.6–14.5)
RBC # BLD AUTO: 5.11 MILLION/UL (ref 3.88–5.62)
RH BLD: POSITIVE
SODIUM SERPL-SCNC: 135 MMOL/L (ref 136–145)
SPECIMEN EXPIRATION DATE: NORMAL
WBC # BLD AUTO: 11.36 THOUSAND/UL (ref 4.31–10.16)

## 2021-05-02 PROCEDURE — 85610 PROTHROMBIN TIME: CPT | Performed by: PHYSICIAN ASSISTANT

## 2021-05-02 PROCEDURE — 99284 EMERGENCY DEPT VISIT MOD MDM: CPT

## 2021-05-02 PROCEDURE — 85025 COMPLETE CBC W/AUTO DIFF WBC: CPT | Performed by: PHYSICIAN ASSISTANT

## 2021-05-02 PROCEDURE — 80048 BASIC METABOLIC PNL TOTAL CA: CPT | Performed by: PHYSICIAN ASSISTANT

## 2021-05-02 PROCEDURE — 96374 THER/PROPH/DIAG INJ IV PUSH: CPT

## 2021-05-02 PROCEDURE — 86850 RBC ANTIBODY SCREEN: CPT | Performed by: PHYSICIAN ASSISTANT

## 2021-05-02 PROCEDURE — 73560 X-RAY EXAM OF KNEE 1 OR 2: CPT

## 2021-05-02 PROCEDURE — 86900 BLOOD TYPING SEROLOGIC ABO: CPT | Performed by: PHYSICIAN ASSISTANT

## 2021-05-02 PROCEDURE — 99285 EMERGENCY DEPT VISIT HI MDM: CPT | Performed by: PHYSICIAN ASSISTANT

## 2021-05-02 PROCEDURE — 96361 HYDRATE IV INFUSION ADD-ON: CPT

## 2021-05-02 PROCEDURE — 96375 TX/PRO/DX INJ NEW DRUG ADDON: CPT

## 2021-05-02 PROCEDURE — 73700 CT LOWER EXTREMITY W/O DYE: CPT

## 2021-05-02 PROCEDURE — 85730 THROMBOPLASTIN TIME PARTIAL: CPT | Performed by: PHYSICIAN ASSISTANT

## 2021-05-02 PROCEDURE — 36415 COLL VENOUS BLD VENIPUNCTURE: CPT | Performed by: PHYSICIAN ASSISTANT

## 2021-05-02 PROCEDURE — 86901 BLOOD TYPING SEROLOGIC RH(D): CPT | Performed by: PHYSICIAN ASSISTANT

## 2021-05-02 RX ORDER — OXYCODONE HYDROCHLORIDE AND ACETAMINOPHEN 5; 325 MG/1; MG/1
1 TABLET ORAL EVERY 4 HOURS PRN
COMMUNITY

## 2021-05-02 RX ORDER — MORPHINE SULFATE 4 MG/ML
4 INJECTION, SOLUTION INTRAMUSCULAR; INTRAVENOUS ONCE
Status: COMPLETED | OUTPATIENT
Start: 2021-05-02 | End: 2021-05-02

## 2021-05-02 RX ORDER — OXYCODONE HYDROCHLORIDE AND ACETAMINOPHEN 5; 325 MG/1; MG/1
1 TABLET ORAL EVERY 6 HOURS PRN
Qty: 10 TABLET | Refills: 0 | Status: SHIPPED | OUTPATIENT
Start: 2021-05-02

## 2021-05-02 RX ORDER — HYDROMORPHONE HCL/PF 1 MG/ML
0.5 SYRINGE (ML) INJECTION ONCE
Status: COMPLETED | OUTPATIENT
Start: 2021-05-02 | End: 2021-05-02

## 2021-05-02 RX ORDER — KETOROLAC TROMETHAMINE 10 MG/1
10 TABLET, FILM COATED ORAL EVERY 6 HOURS PRN
Qty: 20 TABLET | Refills: 0 | Status: SHIPPED | OUTPATIENT
Start: 2021-05-02

## 2021-05-02 RX ORDER — IBUPROFEN 400 MG/1
TABLET ORAL EVERY 6 HOURS PRN
COMMUNITY

## 2021-05-02 RX ADMIN — MORPHINE SULFATE 4 MG: 4 INJECTION INTRAVENOUS at 12:05

## 2021-05-02 RX ADMIN — HYDROMORPHONE HYDROCHLORIDE 0.5 MG: 1 INJECTION, SOLUTION INTRAMUSCULAR; INTRAVENOUS; SUBCUTANEOUS at 13:37

## 2021-05-02 RX ADMIN — SODIUM CHLORIDE 1000 ML: 0.9 INJECTION, SOLUTION INTRAVENOUS at 12:09

## 2021-05-02 NOTE — Clinical Note
Corey Vladimir was seen and treated in our emergency department on 5/2/2021  Diagnosis: Tibial plateau fracture    Corey    He may return on this date: The patient may NOT return to work until cleared by orthopedic surgery  If you have any questions or concerns, please don't hesitate to call        Kaylene Fisher PA-C    ______________________________           _______________          _______________  Hospital Representative                              Date                                Time

## 2021-05-02 NOTE — DISCHARGE INSTRUCTIONS
Please refer to the attached information for strict return instructions  If symptoms worsen or new symptoms develop please return to the ER  Please call orthopedics tomorrow morning (5/3/21) to arrange follow up with orthopedic surgery as soon as possible for surgical repair  If you have numbness, tingling, worsening pain in your lower leg/foot, or any new/worsening symptoms of concern please return immediately to the emergency department
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
## 2021-12-15 ENCOUNTER — HOSPITAL ENCOUNTER (EMERGENCY)
Facility: HOSPITAL | Age: 31
Discharge: HOME | End: 2021-12-15
Attending: EMERGENCY MEDICINE
Payer: COMMERCIAL

## 2021-12-15 VITALS
TEMPERATURE: 97.6 F | BODY MASS INDEX: 24.95 KG/M2 | HEART RATE: 68 BPM | DIASTOLIC BLOOD PRESSURE: 87 MMHG | SYSTOLIC BLOOD PRESSURE: 142 MMHG | WEIGHT: 189.1 LBS | OXYGEN SATURATION: 98 % | RESPIRATION RATE: 16 BRPM

## 2021-12-15 DIAGNOSIS — L03.116 CELLULITIS OF LEFT LOWER EXTREMITY: ICD-10-CM

## 2021-12-15 DIAGNOSIS — L02.91 ABSCESS: Primary | ICD-10-CM

## 2021-12-15 PROCEDURE — 10061 I&D ABSCESS COMP/MULTIPLE: CPT | Performed by: EMERGENCY MEDICINE

## 2021-12-15 PROCEDURE — 99284 EMERGENCY DEPT VISIT MOD MDM: CPT | Performed by: EMERGENCY MEDICINE

## 2021-12-15 PROCEDURE — 99283 EMERGENCY DEPT VISIT LOW MDM: CPT

## 2021-12-15 RX ORDER — LIDOCAINE HYDROCHLORIDE AND EPINEPHRINE 10; 10 MG/ML; UG/ML
5 INJECTION, SOLUTION INFILTRATION; PERINEURAL ONCE
Status: COMPLETED | OUTPATIENT
Start: 2021-12-15 | End: 2021-12-15

## 2021-12-15 RX ORDER — LIDOCAINE HYDROCHLORIDE AND EPINEPHRINE 20; 5 MG/ML; UG/ML
5 INJECTION, SOLUTION EPIDURAL; INFILTRATION; INTRACAUDAL; PERINEURAL ONCE
Status: DISCONTINUED | OUTPATIENT
Start: 2021-12-15 | End: 2021-12-15

## 2021-12-15 RX ORDER — CEPHALEXIN 250 MG/1
500 CAPSULE ORAL EVERY 6 HOURS SCHEDULED
Qty: 40 CAPSULE | Refills: 0 | Status: SHIPPED | OUTPATIENT
Start: 2021-12-15 | End: 2021-12-20

## 2021-12-15 RX ORDER — SULFAMETHOXAZOLE AND TRIMETHOPRIM 800; 160 MG/1; MG/1
1 TABLET ORAL EVERY 12 HOURS SCHEDULED
Qty: 14 TABLET | Refills: 0 | Status: SHIPPED | OUTPATIENT
Start: 2021-12-15 | End: 2021-12-22

## 2021-12-15 RX ADMIN — LIDOCAINE HYDROCHLORIDE AND EPINEPHRINE 5 ML: 10; 10 INJECTION, SOLUTION INFILTRATION; PERINEURAL at 06:19

## 2022-07-09 ENCOUNTER — HOSPITAL ENCOUNTER (EMERGENCY)
Facility: HOSPITAL | Age: 32
Discharge: HOME | End: 2022-07-09
Attending: EMERGENCY MEDICINE
Payer: COMMERCIAL

## 2022-07-09 VITALS
OXYGEN SATURATION: 97 % | DIASTOLIC BLOOD PRESSURE: 99 MMHG | WEIGHT: 189 LBS | TEMPERATURE: 97.8 F | RESPIRATION RATE: 18 BRPM | SYSTOLIC BLOOD PRESSURE: 118 MMHG | HEART RATE: 78 BPM | BODY MASS INDEX: 24.94 KG/M2

## 2022-07-09 DIAGNOSIS — L02.91 ABSCESS: Primary | ICD-10-CM

## 2022-07-09 PROCEDURE — 99283 EMERGENCY DEPT VISIT LOW MDM: CPT

## 2022-07-09 PROCEDURE — 96372 THER/PROPH/DIAG INJ SC/IM: CPT

## 2022-07-09 PROCEDURE — 99284 EMERGENCY DEPT VISIT MOD MDM: CPT | Performed by: EMERGENCY MEDICINE

## 2022-07-09 RX ORDER — CEPHALEXIN 500 MG/1
500 CAPSULE ORAL ONCE
Status: COMPLETED | OUTPATIENT
Start: 2022-07-09 | End: 2022-07-09

## 2022-07-09 RX ORDER — SULFAMETHOXAZOLE AND TRIMETHOPRIM 800; 160 MG/1; MG/1
1 TABLET ORAL EVERY 12 HOURS SCHEDULED
Qty: 14 TABLET | Refills: 0 | Status: SHIPPED | OUTPATIENT
Start: 2022-07-09 | End: 2022-07-16

## 2022-07-09 RX ORDER — SULFAMETHOXAZOLE AND TRIMETHOPRIM 800; 160 MG/1; MG/1
1 TABLET ORAL ONCE
Status: COMPLETED | OUTPATIENT
Start: 2022-07-09 | End: 2022-07-09

## 2022-07-09 RX ORDER — ACETAMINOPHEN 325 MG/1
975 TABLET ORAL ONCE
Status: COMPLETED | OUTPATIENT
Start: 2022-07-09 | End: 2022-07-09

## 2022-07-09 RX ORDER — KETOROLAC TROMETHAMINE 30 MG/ML
15 INJECTION, SOLUTION INTRAMUSCULAR; INTRAVENOUS ONCE
Status: COMPLETED | OUTPATIENT
Start: 2022-07-09 | End: 2022-07-09

## 2022-07-09 RX ORDER — CEPHALEXIN 250 MG/1
500 CAPSULE ORAL EVERY 6 HOURS SCHEDULED
Qty: 40 CAPSULE | Refills: 0 | Status: SHIPPED | OUTPATIENT
Start: 2022-07-09 | End: 2022-07-14

## 2022-07-09 RX ADMIN — ACETAMINOPHEN 975 MG: 325 TABLET ORAL at 07:08

## 2022-07-09 RX ADMIN — SULFAMETHOXAZOLE AND TRIMETHOPRIM 1 TABLET: 800; 160 TABLET ORAL at 07:13

## 2022-07-09 RX ADMIN — KETOROLAC TROMETHAMINE 15 MG: 30 INJECTION, SOLUTION INTRAMUSCULAR; INTRAVENOUS at 07:08

## 2022-07-09 RX ADMIN — CEPHALEXIN 500 MG: 500 CAPSULE ORAL at 07:13

## 2022-07-09 NOTE — ED PROVIDER NOTES
History  Chief Complaint   Patient presents with    Cyst     States he has a cyst for about a week, came to ER 2 days ago but left because the wait was long  Took motrin/tylenol with no relief  HPI    27 yo M HIV on biktarvy presents to ed for eval of a cyst above anal region States present for the past one week  Tried motrin tylenol no relief  No fevers  No difficulty with stooling  No abd pain  Remainder ros negative  Prior to Admission Medications   Prescriptions Last Dose Informant Patient Reported? Taking? albuterol (PROVENTIL HFA,VENTOLIN HFA) 90 mcg/act inhaler   No No   Sig: Inhale 2 puffs every 4 (four) hours as needed for wheezing   ibuprofen (MOTRIN) 400 mg tablet   Yes No   Sig: Take by mouth every 6 (six) hours as needed for mild pain   ketorolac (TORADOL) 10 mg tablet   No No   Sig: Take 1 tablet (10 mg total) by mouth every 6 (six) hours as needed for moderate pain or severe pain   oxyCODONE-acetaminophen (PERCOCET) 5-325 mg per tablet   Yes No   Sig: Take 1 tablet by mouth every 4 (four) hours as needed for moderate pain   oxyCODONE-acetaminophen (PERCOCET) 5-325 mg per tablet   No No   Sig: Take 1 tablet by mouth every 6 (six) hours as needed for severe pain for up to 10 dosesMax Daily Amount: 4 tablets      Facility-Administered Medications: None       Past Medical History:   Diagnosis Date    Asthma        History reviewed  No pertinent surgical history  History reviewed  No pertinent family history  I have reviewed and agree with the history as documented      E-Cigarette/Vaping    E-Cigarette Use Never User      E-Cigarette/Vaping Substances     Social History     Tobacco Use    Smoking status: Current Every Day Smoker     Packs/day: 0 25     Types: Cigarettes    Smokeless tobacco: Never Used   Vaping Use    Vaping Use: Never used   Substance Use Topics    Alcohol use: Yes     Comment: weekends     Drug use: Yes     Types: Marijuana     Comment: daily       Review of Systems   Constitutional: Negative for chills, fatigue and fever  HENT: Negative for nosebleeds and sore throat  Eyes: Negative for redness and visual disturbance  Respiratory: Negative for shortness of breath and wheezing  Cardiovascular: Negative for chest pain and palpitations  Gastrointestinal: Negative for abdominal pain and diarrhea  Endocrine: Negative for polyuria  Genitourinary: Negative for difficulty urinating and testicular pain  Musculoskeletal: Negative for back pain and neck stiffness  Skin: Negative for rash and wound  Neurological: Negative for seizures, speech difficulty and headaches  Psychiatric/Behavioral: Negative for dysphoric mood and hallucinations  All other systems reviewed and are negative  Physical Exam  Physical Exam  Vitals and nursing note reviewed  Constitutional:       Appearance: He is well-developed  HENT:      Head: Normocephalic and atraumatic  Right Ear: External ear normal       Left Ear: External ear normal    Eyes:      Conjunctiva/sclera: Conjunctivae normal    Cardiovascular:      Rate and Rhythm: Normal rate and regular rhythm  Heart sounds: Normal heart sounds  Pulmonary:      Effort: Pulmonary effort is normal       Breath sounds: Normal breath sounds  No wheezing  Chest:      Chest wall: No tenderness  Abdominal:      General: Bowel sounds are normal       Palpations: Abdomen is soft  Tenderness: There is no abdominal tenderness  There is no guarding  Genitourinary:     Comments: Mild erythema around left butt cheek, 1 cm region, no drainable collection at this time  Musculoskeletal:         General: Normal range of motion  Cervical back: Normal range of motion  Skin:     General: Skin is warm and dry  Findings: No rash  Neurological:      Mental Status: He is alert and oriented to person, place, and time  Cranial Nerves: No cranial nerve deficit  Sensory: No sensory deficit        Motor: No abnormal muscle tone  Coordination: Coordination normal          Vital Signs  ED Triage Vitals [07/09/22 0632]   Temperature Pulse Respirations Blood Pressure SpO2   97 8 °F (36 6 °C) 78 18 118/99 97 %      Temp Source Heart Rate Source Patient Position - Orthostatic VS BP Location FiO2 (%)   Oral Monitor Standing Left arm --      Pain Score       10 - Worst Possible Pain           Vitals:    07/09/22 0632   BP: 118/99   Pulse: 78   Patient Position - Orthostatic VS: Standing         Visual Acuity      ED Medications  Medications   acetaminophen (TYLENOL) tablet 975 mg (975 mg Oral Given 7/9/22 0708)   ketorolac (TORADOL) injection 15 mg (15 mg Intramuscular Given 7/9/22 0708)   sulfamethoxazole-trimethoprim (BACTRIM DS) 800-160 mg per tablet 1 tablet (1 tablet Oral Given 7/9/22 0713)   cephalexin (KEFLEX) capsule 500 mg (500 mg Oral Given 7/9/22 0713)       Diagnostic Studies  Results Reviewed     None                 No orders to display              Procedures  Procedures         ED Course           [facility]     Perianal pain, mild cellulitis, no drainable abscess at this time, will treat with abx, follow up with gen surg  The patient was instructed to follow up as documented  Strict return precautions were discussed with the patient and the patient was instructed to return to the emergency department immediately if symptoms worsen  The patient/patient family member acknowledged and were in agreement with plan  Disposition  Final diagnoses:   Abscess     Time reflects when diagnosis was documented in both MDM as applicable and the Disposition within this note     Time User Action Codes Description Comment    7/9/2022  7:07 AM Winter Maddox Add [L02 91] Abscess       ED Disposition     ED Disposition   Discharge    Condition   Stable    Date/Time   Sat Jul 9, 2022  7:07 AM    Comment   Tate discharge to home/self care                 Follow-up Information     Follow up With Specialties Details Why Contact Info Additional Information    [facility] [address] [address]  [facility] [address] [place] [address] General Surgery Schedule an appointment as soon as possible for a visit today For follow up regarding your hemorrhoids, For follow up regarding your symptoms and recheck [place] [id number] [phone] [address] [facility], [address] [facility], [address], [address] [phone]          Patient's Medications   Discharge Prescriptions    CEPHALEXIN (KEFLEX) 250 MG CAPSULE    Take 2 capsules (500 mg total) by mouth every 6 (six) hours for 5 days       Start Date: 7/9/2022  End Date: 7/14/2022       Order Dose: 500 mg       Quantity: 40 capsule    Refills: 0    SULFAMETHOXAZOLE-TRIMETHOPRIM (BACTRIM DS) 800-160 MG PER TABLET    Take 1 tablet by mouth every 12 (twelve) hours for 7 days smx-tmp DS (BACTRIM) 800-160 mg tabs (1tab q12 D7)       Start Date: 7/9/2022  End Date: 7/16/2022       Order Dose: 1 tablet       Quantity: 14 tablet    Refills: 0       No discharge procedures on file      PDMP Review     None          ED Provider  Electronically Signed by           Rikik Arroyo MD  07/09/22 7341

## 2022-10-30 ENCOUNTER — HOSPITAL ENCOUNTER (EMERGENCY)
Facility: HOSPITAL | Age: 32
Discharge: HOME | End: 2022-10-30
Attending: EMERGENCY MEDICINE

## 2022-10-30 VITALS
HEART RATE: 89 BPM | SYSTOLIC BLOOD PRESSURE: 180 MMHG | OXYGEN SATURATION: 99 % | DIASTOLIC BLOOD PRESSURE: 111 MMHG | RESPIRATION RATE: 16 BRPM | BODY MASS INDEX: 25.73 KG/M2 | WEIGHT: 195 LBS

## 2022-10-30 DIAGNOSIS — R21 RASH: Primary | ICD-10-CM

## 2022-10-30 RX ORDER — HYDROXYZINE HYDROCHLORIDE 25 MG/1
25 TABLET, FILM COATED ORAL EVERY 6 HOURS
Qty: 12 TABLET | Refills: 0 | Status: SHIPPED | OUTPATIENT
Start: 2022-10-30

## 2022-10-30 RX ORDER — HYDROXYZINE HYDROCHLORIDE 25 MG/1
25 TABLET, FILM COATED ORAL ONCE
Status: COMPLETED | OUTPATIENT
Start: 2022-10-30 | End: 2022-10-30

## 2022-10-30 RX ADMIN — HYDROXYZINE HYDROCHLORIDE 25 MG: 25 TABLET, FILM COATED ORAL at 15:05

## 2022-10-30 NOTE — ED PROVIDER NOTES
History  Chief Complaint   Patient presents with   • Rash     Lower back, started last night  C/o pain/itching     Patient is a 21-year-old male who comes in for evaluation of an itchy rash on his lower back  Patient states this started approximately 3 days ago  Is in no acute distress this time  Does admit to having an undetectable HIV viral load, as well as having sex with men  Patient denies any dysuria, hematuria, ulcers a rash on penis, nausea, vomiting, fever, any other symptoms this time  History provided by:  Patient   used: No    Rash  Location:  Torso  Quality: itchiness    Severity:  Mild  Duration:  3 days  Timing:  Constant  Associated symptoms: no fever, no nausea, no periorbital edema, no tongue swelling and not vomiting        Prior to Admission Medications   Prescriptions Last Dose Informant Patient Reported? Taking? albuterol (PROVENTIL HFA,VENTOLIN HFA) 90 mcg/act inhaler   No No   Sig: Inhale 2 puffs every 4 (four) hours as needed for wheezing   ibuprofen (MOTRIN) 400 mg tablet   Yes No   Sig: Take by mouth every 6 (six) hours as needed for mild pain   ketorolac (TORADOL) 10 mg tablet   No No   Sig: Take 1 tablet (10 mg total) by mouth every 6 (six) hours as needed for moderate pain or severe pain   oxyCODONE-acetaminophen (PERCOCET) 5-325 mg per tablet   Yes No   Sig: Take 1 tablet by mouth every 4 (four) hours as needed for moderate pain   oxyCODONE-acetaminophen (PERCOCET) 5-325 mg per tablet   No No   Sig: Take 1 tablet by mouth every 6 (six) hours as needed for severe pain for up to 10 dosesMax Daily Amount: 4 tablets      Facility-Administered Medications: None       Past Medical History:   Diagnosis Date   • Asthma    • HIV (human immunodeficiency virus infection) ([facility] [address] )    • Hypertension        Past Surgical History:   Procedure Laterality Date   • PATELLA FRACTURE SURGERY Right        History reviewed  No pertinent family history    I have reviewed and agree with the history as documented  E-Cigarette/Vaping   • E-Cigarette Use Never User      E-Cigarette/Vaping Substances     Social History     Tobacco Use   • Smoking status: Current Every Day Smoker     Packs/day: 0 25     Types: Cigarettes   • Smokeless tobacco: Never Used   Vaping Use   • Vaping Use: Never used   Substance Use Topics   • Alcohol use: Not Currently     Comment: weekends    • Drug use: Yes     Types: Marijuana     Comment: daily       Review of Systems   Constitutional: Negative  Negative for fever  HENT: Negative  Eyes: Negative  Respiratory: Negative  Cardiovascular: Negative  Gastrointestinal: Negative  Negative for nausea and vomiting  Genitourinary: Negative  Negative for dysuria, hematuria, penile discharge, penile pain, penile swelling and testicular pain  Musculoskeletal: Negative  Skin: Positive for rash  Neurological: Negative  Psychiatric/Behavioral: Negative  Physical Exam  Physical Exam  Vitals reviewed  Constitutional:       Appearance: Normal appearance  He is normal weight  HENT:      Head: Normocephalic and atraumatic  Right Ear: External ear normal       Left Ear: External ear normal       Nose: Nose normal    Eyes:      Conjunctiva/sclera: Conjunctivae normal    Cardiovascular:      Rate and Rhythm: Normal rate  Pulmonary:      Effort: Pulmonary effort is normal    Musculoskeletal:         General: Normal range of motion  Cervical back: Normal range of motion  Skin:     General: Skin is warm and dry  Findings: Rash present  Neurological:      Mental Status: He is alert                   Vital Signs  ED Triage Vitals [10/30/22 1431]   Temp Pulse Respirations Blood Pressure SpO2   -- 89 16 (!) 180/111 99 %      Temp src Heart Rate Source Patient Position - Orthostatic VS BP Location FiO2 (%)   -- Monitor Sitting Right arm --      Pain Score       --           Vitals:    10/30/22 1431   BP: (!) 180/111   Pulse: 89   Patient Position - Orthostatic VS: Sitting         Visual Acuity      ED Medications  Medications   hydrOXYzine HCL (ATARAX) tablet 25 mg (25 mg Oral Given 10/30/22 1505)       Diagnostic Studies  Results Reviewed     Procedure Component Value Units Date/Time    Monkeypox (Orthopoxvirus), PCR [[id number]] Collected: 10/30/22 1505    Lab Status: In process Specimen: Other from [place] Updated: 10/30/22 1514                 No orders to display              Procedures  Procedures         ED Course                                             MDM  Number of Diagnoses or Management Options  Rash: new and does not require workup  Diagnosis management comments: Patient comes in for 3 days of rash  Patient is no acute distress this time  Patient was swabbed for monkeypox, discharged home with Atarax for itching    Counseling: I had a detailed discussion with the patient and/or guardian regarding: the historical points, exam findings, and any diagnostic results supporting the discharge diagnosis, lab results, radiology results, discharge instructions reviewed with patient and/or family/caregiver and understanding was verbalized  Instructions given to return to the emergency department if symptoms worsen or persist, or if there are any questions or concerns that arise at home       All labs reviewed and utilized in the medical decision making process     All radiology studies independently viewed by me and interpreted by the radiologist     Portions of the record may have been created with voice recognition software   Occasional wrong word or "sound a like" substitutions may have occurred due to the inherent limitations of voice recognition software   Read the chart carefully and recognize, using context, where substitutions have occurred           Amount and/or Complexity of Data Reviewed  Clinical lab tests: ordered    Risk of Complications, Morbidity, and/or Mortality  Presenting problems: minimal  Diagnostic procedures: minimal  Management options: minimal    Patient Progress  Patient progress: stable      Disposition  Final diagnoses:   Rash     Time reflects when diagnosis was documented in both MDM as applicable and the Disposition within this note     Time User Action Codes Description Comment    10/30/2022  2:48 PM Kenji August [R21] Rash       ED Disposition     ED Disposition   Discharge    Condition   Stable    Date/Time   Sun Oct 30, 2022  2:48 PM    [address] [facility] discharge to home/self care                 Follow-up Information     Follow up With Specialties Details Why Contact Info Additional Information    Debbie Pearl MD [facility] [address] [facility] Emergency Medicine  As needed, If symptoms worsen [address] [address] [address] [facility] Emergency Department          Discharge Medication List as of 10/30/2022  2:49 PM      START taking these medications    Details   hydrOXYzine HCL (ATARAX) 25 mg tablet Take 1 tablet (25 mg total) by mouth every 6 (six) hours, Starting Sun 10/30/2022, Normal         CONTINUE these medications which have NOT CHANGED    Details   albuterol (PROVENTIL HFA,VENTOLIN HFA) 90 mcg/act inhaler Inhale 2 puffs every 4 (four) hours as needed for wheezing, Starting Fri 2/19/2021, Print      ibuprofen (MOTRIN) 400 mg tablet Take by mouth every 6 (six) hours as needed for mild pain, Historical Med      ketorolac (TORADOL) 10 mg tablet Take 1 tablet (10 mg total) by mouth every 6 (six) hours as needed for moderate pain or severe pain, Starting Sun 5/2/2021, Normal      !! oxyCODONE-acetaminophen (PERCOCET) 5-325 mg per tablet Take 1 tablet by mouth every 4 (four) hours as needed for moderate pain, Historical Med      !! oxyCODONE-acetaminophen (PERCOCET) 5-325 mg per tablet Take 1 tablet by mouth every 6 (six) hours as needed for severe pain for up to 10 dosesMax Daily Amount: 4 tablets, Starting Sun 5/2/2021, Normal       !! - Potential duplicate medications found  Please discuss with provider  No discharge procedures on file      PDMP Review     None          ED Provider  Electronically Signed by           Kayce Childs PA-C  10/30/22 9622

## 2022-10-30 NOTE — Clinical Note
Corey Gilbert was seen and treated in our emergency department on 10/30/2022  No restrictions            Diagnosis:     Corey    He may return on this date: 10/31/2022         If you have any questions or concerns, please don't hesitate to call        Terry Thomas PA-C    ______________________________           _______________          _______________  Hospital Representative                              Date                                Time

## 2022-11-01 LAB — NONVAR ORTHPX DNA SPEC QL NAA+PROBE: NOT DETECTED

## 2022-11-30 ENCOUNTER — HOSPITAL ENCOUNTER (EMERGENCY)
Facility: HOSPITAL | Age: 32
Discharge: HOME | End: 2022-11-30
Attending: EMERGENCY MEDICINE

## 2022-11-30 VITALS
TEMPERATURE: 98.4 F | DIASTOLIC BLOOD PRESSURE: 108 MMHG | HEART RATE: 68 BPM | BODY MASS INDEX: 27.31 KG/M2 | WEIGHT: 207.01 LBS | OXYGEN SATURATION: 100 % | SYSTOLIC BLOOD PRESSURE: 169 MMHG | RESPIRATION RATE: 16 BRPM

## 2022-11-30 DIAGNOSIS — K02.9 DENTAL CARIES: ICD-10-CM

## 2022-11-30 DIAGNOSIS — K08.89 DENTALGIA: Primary | ICD-10-CM

## 2022-11-30 RX ORDER — PENICILLIN V POTASSIUM 250 MG/1
500 TABLET ORAL ONCE
Status: COMPLETED | OUTPATIENT
Start: 2022-11-30 | End: 2022-11-30

## 2022-11-30 RX ORDER — PENICILLIN V POTASSIUM 500 MG/1
500 TABLET ORAL 4 TIMES DAILY
Qty: 28 TABLET | Refills: 0 | Status: SHIPPED | OUTPATIENT
Start: 2022-11-30 | End: 2022-12-07

## 2022-11-30 RX ORDER — KETOROLAC TROMETHAMINE 30 MG/ML
30 INJECTION, SOLUTION INTRAMUSCULAR; INTRAVENOUS ONCE
Status: COMPLETED | OUTPATIENT
Start: 2022-11-30 | End: 2022-11-30

## 2022-11-30 RX ORDER — IBUPROFEN 600 MG/1
600 TABLET ORAL EVERY 6 HOURS PRN
Qty: 30 TABLET | Refills: 0 | Status: SHIPPED | OUTPATIENT
Start: 2022-11-30

## 2022-11-30 RX ORDER — LIDOCAINE HYDROCHLORIDE 20 MG/ML
15 SOLUTION OROPHARYNGEAL ONCE
Status: COMPLETED | OUTPATIENT
Start: 2022-11-30 | End: 2022-11-30

## 2022-11-30 RX ADMIN — KETOROLAC TROMETHAMINE 30 MG: 30 INJECTION, SOLUTION INTRAMUSCULAR; INTRAVENOUS at 23:48

## 2022-11-30 RX ADMIN — LIDOCAINE HYDROCHLORIDE 15 ML: 20 SOLUTION ORAL; TOPICAL at 23:48

## 2022-11-30 RX ADMIN — PENICILLIN V POTASSIUM 500 MG: 250 TABLET, FILM COATED ORAL at 23:48

## 2022-12-01 NOTE — ED PROVIDER NOTES
History  Chief Complaint   Patient presents with   • Dental Pain     Dental pain, needs something for pain cause he cannot sleep, did not take anything for the pain  Dental Pain  Location:  Lower  Quality:  Aching, constant and dull  Severity:  Severe  Onset quality:  Gradual  Timing:  Constant  Progression:  Worsening  Chronicity:  New  Context: dental caries, dental fracture and poor dentition    Relieved by:  Nothing  Worsened by:  Cold food/drink, hot food/drink, touching, jaw movement and pressure  Ineffective treatments:  None tried  Associated symptoms: gum swelling    Associated symptoms: no congestion, no difficulty swallowing, no drooling, no facial pain, no facial swelling, no fever, no headaches, no neck pain, no neck swelling, no oral bleeding, no oral lesions and no trismus        Prior to Admission Medications   Prescriptions Last Dose Informant Patient Reported? Taking?    albuterol (PROVENTIL HFA,VENTOLIN HFA) 90 mcg/act inhaler   No No   Sig: Inhale 2 puffs every 4 (four) hours as needed for wheezing   hydrOXYzine HCL (ATARAX) 25 mg tablet   No No   Sig: Take 1 tablet (25 mg total) by mouth every 6 (six) hours   ibuprofen (MOTRIN) 400 mg tablet   Yes No   Sig: Take by mouth every 6 (six) hours as needed for mild pain   ketorolac (TORADOL) 10 mg tablet   No No   Sig: Take 1 tablet (10 mg total) by mouth every 6 (six) hours as needed for moderate pain or severe pain   oxyCODONE-acetaminophen (PERCOCET) 5-325 mg per tablet   Yes No   Sig: Take 1 tablet by mouth every 4 (four) hours as needed for moderate pain   oxyCODONE-acetaminophen (PERCOCET) 5-325 mg per tablet   No No   Sig: Take 1 tablet by mouth every 6 (six) hours as needed for severe pain for up to 10 dosesMax Daily Amount: 4 tablets      Facility-Administered Medications: None       Past Medical History:   Diagnosis Date   • Asthma    • [facility]    • Hypertension        Past Surgical History: Procedure Laterality Date   • PATELLA FRACTURE SURGERY Right        History reviewed  No pertinent family history  I have reviewed and agree with the history as documented  E-Cigarette/Vaping   • E-Cigarette Use Never User      E-Cigarette/Vaping Substances     Social History     Tobacco Use   • Smoking status: Every Day     Packs/day: 0 25     Types: Cigarettes   • Smokeless tobacco: Never   Vaping Use   • Vaping Use: Never used   Substance Use Topics   • Alcohol use: Not Currently     Comment: weekends    • Drug use: Yes     Types: Marijuana     Comment: daily       Review of Systems   Constitutional: Negative for fever  HENT: Negative for congestion, drooling, facial swelling, mouth sores and voice change  Musculoskeletal: Negative for neck pain  Neurological: Negative for headaches  All other systems reviewed and are negative  Physical Exam  Physical Exam  Vitals and nursing note reviewed  Constitutional:       General: He is not in acute distress  Appearance: Normal appearance  He is well-developed  He is not ill-appearing, toxic-appearing or diaphoretic  HENT:      Head: Normocephalic and atraumatic  Right Ear: External ear normal       Left Ear: External ear normal       Nose: Nose normal       Mouth/Throat:      Lips: Pink  Mouth: Mucous membranes are moist       Dentition: Dental tenderness, gingival swelling and dental caries present  Tongue: No lesions  Pharynx: Oropharynx is clear  Uvula midline  Eyes:      General: No scleral icterus  Conjunctiva/sclera: Conjunctivae normal       Pupils: Pupils are equal, round, and reactive to light  Pulmonary:      Effort: Pulmonary effort is normal  No respiratory distress  Musculoskeletal:      Cervical back: Normal range of motion and neck supple  Lymphadenopathy:      Cervical: No cervical adenopathy  Skin:     General: Skin is warm and dry  Neurological:      General: No focal deficit present  Mental Status: He is alert and oriented to person, place, and time  Psychiatric:         Mood and Affect: Mood normal          Behavior: Behavior normal          Vital Signs  ED Triage Vitals [11/30/22 2342]   Temperature Pulse Respirations Blood Pressure SpO2   98 4 °F (36 9 °C) 68 16 (!) 169/108 100 %      Temp Source Heart Rate Source Patient Position - Orthostatic VS BP Location FiO2 (%)   Oral Monitor Sitting Left arm --      Pain Score       10 - Worst Possible Pain           Vitals:    11/30/22 2342   BP: (!) 169/108   Pulse: 68   Patient Position - Orthostatic VS: Sitting         Visual Acuity      ED Medications  Medications   ketorolac (TORADOL) injection 30 mg (has no administration in time range)   penicillin V potassium (VEETID) tablet 500 mg (has no administration in time range)   Lidocaine Viscous HCl (XYLOCAINE) 2 % mucosal solution 15 mL (has no administration in time range)       Diagnostic Studies  Results Reviewed     None                 No orders to display              Procedures  Procedures         ED Course                                             MDM    Disposition  Final diagnoses:   [address] caries     Time reflects when diagnosis was documented in both MDM as applicable and the Disposition within this note     Time User Action Codes Description Comment    11/30/2022 11:44 PM Kee Handley Add [K08 89] Dentalgia     11/30/2022 11:44 PM Kee Handley Add [K02 9] Dental caries       ED Disposition     ED Disposition   Discharge    Condition   Stable    Date/Time   Wed Nov 30, 2022 11:44 PM    Comment   Tate discharge to home/self care                 Follow-up Information    None         Patient's Medications   Discharge Prescriptions    IBUPROFEN (MOTRIN) 600 MG TABLET    Take 1 tablet (600 mg total) by mouth every 6 (six) hours as needed for mild pain       Start Date: 11/30/2022End Date: --       Order Dose: 600 mg       Quantity: 30 tablet    Refills: 0 PENICILLIN V POTASSIUM (VEETID) 500 MG TABLET    Take 1 tablet (500 mg total) by mouth 4 (four) times a day for 7 days       Start Date: 11/30/2022End Date: 12/7/2022       Order Dose: 500 mg       Quantity: 28 tablet    Refills: 0       No discharge procedures on file      PDMP Review     None          ED Provider  Electronically Signed by           David Vasquez DO  11/30/22 [facility] facility

## 2022-12-08 ENCOUNTER — OFFICE VISIT (OUTPATIENT)
Dept: DENTISTRY | Facility: CLINIC | Age: 32
End: 2022-12-08

## 2022-12-08 VITALS — TEMPERATURE: 99.1 F | HEART RATE: 71 BPM | SYSTOLIC BLOOD PRESSURE: 148 MMHG | DIASTOLIC BLOOD PRESSURE: 93 MMHG

## 2022-12-08 DIAGNOSIS — K04.02 IRREVERSIBLE PULPITIS: Primary | ICD-10-CM

## 2022-12-27 ENCOUNTER — TELEPHONE (OUTPATIENT)
Dept: UROLOGY | Facility: MEDICAL CENTER | Age: 32
End: 2022-12-27

## 2022-12-27 ENCOUNTER — OFFICE VISIT (OUTPATIENT)
Dept: FAMILY MEDICINE CLINIC | Facility: CLINIC | Age: 32
End: 2022-12-27

## 2022-12-27 VITALS
TEMPERATURE: 96.4 F | WEIGHT: 205 LBS | SYSTOLIC BLOOD PRESSURE: 150 MMHG | BODY MASS INDEX: 27.17 KG/M2 | HEIGHT: 73 IN | DIASTOLIC BLOOD PRESSURE: 90 MMHG | HEART RATE: 91 BPM | RESPIRATION RATE: 16 BRPM | OXYGEN SATURATION: 99 %

## 2022-12-27 DIAGNOSIS — A63.0 ANOGENITAL WARTS: ICD-10-CM

## 2022-12-27 DIAGNOSIS — J45.909 MODERATE ASTHMA, UNSPECIFIED WHETHER COMPLICATED, UNSPECIFIED WHETHER PERSISTENT: Primary | ICD-10-CM

## 2022-12-27 DIAGNOSIS — A63.0 PENILE WART: ICD-10-CM

## 2022-12-27 DIAGNOSIS — B34.9 VIRAL SYNDROME: ICD-10-CM

## 2022-12-27 DIAGNOSIS — B02.29 POSTHERPETIC NEURALGIA: ICD-10-CM

## 2022-12-27 PROBLEM — Z20.822 ENCOUNTER FOR LABORATORY TESTING FOR COVID-19 VIRUS: Status: ACTIVE | Noted: 2022-12-27

## 2022-12-27 RX ORDER — FLUTICASONE PROPIONATE 44 UG/1
2 AEROSOL, METERED RESPIRATORY (INHALATION) 2 TIMES DAILY
Qty: 10.6 G | Refills: 3 | Status: SHIPPED | OUTPATIENT
Start: 2022-12-27

## 2022-12-27 RX ORDER — CAPSAICIN 0.025 %
237 CREAM (GRAM) TOPICAL 3 TIMES DAILY
Qty: 60 G | Refills: 0 | Status: SHIPPED | OUTPATIENT
Start: 2022-12-27

## 2022-12-27 RX ORDER — BICTEGRAVIR SODIUM, EMTRICITABINE, AND TENOFOVIR ALAFENAMIDE FUMARATE 50; 200; 25 MG/1; MG/1; MG/1
1 TABLET ORAL DAILY
COMMUNITY
Start: 2022-12-02

## 2022-12-27 RX ORDER — ALBUTEROL SULFATE 90 UG/1
2 AEROSOL, METERED RESPIRATORY (INHALATION) EVERY 4 HOURS PRN
Qty: 18 G | Refills: 3 | Status: SHIPPED | OUTPATIENT
Start: 2022-12-27

## 2022-12-27 NOTE — PROGRESS NOTES
Name: Jose Berumen      : 1990      MRN: [id number]  Encounter Provider: Schuyler Bran MD  Encounter Date: 2022   Encounter department: [address] [place] [address]     1  Moderate asthma, unspecified whether complicated, unspecified whether persistent  Assessment & Plan:  Uncontrolled  Patient reports he wakes up every night with cough and SOB  Reports using his rescue inhaler 4 times daily  - Albuterol refill with nebulizer and supplies    - Prescribed Flovent and will follow up during next office visit  Orders:  -     Nebulizer  -     Nebulizer Supplies  -     fluticasone (Flovent HFA) 44 mcg/act inhaler; Inhale 2 puffs 2 (two) times a day Rinse mouth after use  -     albuterol (PROVENTIL HFA,VENTOLIN HFA) 90 mcg/act inhaler; Inhale 2 puffs every 4 (four) hours as needed for wheezing    2  Postherpetic neuralgia  Assessment & Plan:  Patient seen in the ED on 10/30/22 due to a rash on the skin, located in the left lower lumbar region (please see media from the ED visit)  At the time, patient was discharged with Atarax for itching (per chart review)  Patient reports lingering pain in the same region (10/10) since the onset and reports the rash has also spread to the shoulder bilaterally  Due to the distribution of the initial rash with stabbing pain that was present in correlation with the picture in the chart, patient possibly had shingles and is currently experiencing post-herpetic neuralgia  - Capsaicin cream topically TID as needed  - Follow up appointment in the office with me in 4 weeks for further management if needed  Orders:  -     capsaicin (ZOSTRIX) 0 025 % cream; Apply 964 application topically 3 (three) times a day    3  Anogenital warts  Assessment & Plan:  Painful wart-like lesion on the anus  Bleeds on occasions with wiping and as patients reports it is painful to sit       - Referral for colorectal surgery with assistance in scheduling an appointment  Orders:  -     Ambulatory Referral to Colorectal Surgery; Future    4  Penile wart  Assessment & Plan:  Wart-like skin changes located in the medial portion of the shaft of the penis with several smaller ones as well extending up to the glans of the penis  Patient wishes to get the skin changes removed as soon as possible  - Ambulatory referral for Urology STAT  - Will help patient with scheduling an appointment  Orders:  -     Ambulatory Referral to Urology; Future         Subjective      34 yo male patient with a PMH of HIV, asthma and hih hblood pressure comes to the office as a new patient  Current complaints include: rash on the back  Patient also complains of bumps on his penis and anus  Please see Assessment and Plan for more details  Denies fever, chills, headache, blurry vision, soar throat, chest pain, SOB, wheezing, n/v/c/d, abdominal pain or urinary symptoms  Patient reported having 'pain everywhere' and wished to receive pain medication stronger than Tylenol/Motrin  Patient reported he will go to the ED to receive stronger medication  Review of Systems   Constitutional: Negative for fatigue and fever  HENT: Negative for congestion, rhinorrhea, sinus pressure, sinus pain, sore throat and tinnitus  Eyes: Negative for visual disturbance  Respiratory: Negative for cough, chest tightness and shortness of breath  Cardiovascular: Negative for chest pain and leg swelling  Gastrointestinal: Positive for rectal pain  Negative for abdominal pain, constipation, diarrhea, nausea and vomiting  Genitourinary: Negative for difficulty urinating, frequency and urgency  Musculoskeletal: Negative for arthralgias  Skin: Positive for rash  Neurological: Negative for seizures, syncope, light-headedness and headaches  All other systems reviewed and are negative        Current Outpatient Medications on File Prior to Visit Medication Sig   • Biktarvy -25 MG tablet Take 1 tablet by mouth daily   • hydrOXYzine HCL (ATARAX) 25 mg tablet Take 1 tablet (25 mg total) by mouth every 6 (six) hours   • ibuprofen (MOTRIN) 400 mg tablet Take by mouth every 6 (six) hours as needed for mild pain   • ibuprofen (MOTRIN) 600 mg tablet Take 1 tablet (600 mg total) by mouth every 6 (six) hours as needed for mild pain   • ketorolac (TORADOL) 10 mg tablet Take 1 tablet (10 mg total) by mouth every 6 (six) hours as needed for moderate pain or severe pain   • oxyCODONE-acetaminophen (PERCOCET) 5-325 mg per tablet Take 1 tablet by mouth every 4 (four) hours as needed for moderate pain   • oxyCODONE-acetaminophen (PERCOCET) 5-325 mg per tablet Take 1 tablet by mouth every 6 (six) hours as needed for severe pain for up to 10 dosesMax Daily Amount: 4 tablets   • [DISCONTINUED] albuterol (PROVENTIL HFA,VENTOLIN HFA) 90 mcg/act inhaler Inhale 2 puffs every 4 (four) hours as needed for wheezing       Objective     /90 (BP Location: Left arm, Patient Position: Sitting, Cuff Size: Large)   Pulse 91   Temp (!) 96 4 °F (35 8 °C) (Temporal)   Resp 16   Ht 6' 1" (1 854 m)   Wt 93 kg (205 lb)   SpO2 99%   BMI 27 05 kg/m²     Physical Exam  Constitutional:       General: He is not in acute distress  Appearance: He is normal weight  He is not ill-appearing or toxic-appearing  HENT:      Head: Normocephalic and atraumatic  Right Ear: External ear normal  There is no impacted cerumen  Left Ear: External ear normal  There is no impacted cerumen  Nose: Nose normal  No congestion or rhinorrhea  Mouth/Throat:      Mouth: Mucous membranes are moist       Pharynx: Oropharynx is clear  No posterior oropharyngeal erythema  Eyes:      General: No scleral icterus  Extraocular Movements: Extraocular movements intact  Neck:      Vascular: No carotid bruit  Cardiovascular:      Rate and Rhythm: Normal rate and regular rhythm  Pulses: Normal pulses  Heart sounds: Normal heart sounds  No murmur heard  No friction rub  Pulmonary:      Effort: Pulmonary effort is normal  No respiratory distress  Breath sounds: Normal breath sounds  No wheezing  Abdominal:      General: Abdomen is flat  Bowel sounds are normal  There is no distension  Palpations: Abdomen is soft  Tenderness: There is no abdominal tenderness  Genitourinary:          Musculoskeletal:         General: Normal range of motion  Cervical back: No rigidity  Back:       Right lower leg: No edema  Left lower leg: No edema  Lymphadenopathy:      Cervical: No cervical adenopathy  Skin:     General: Skin is warm  Coloration: Skin is not jaundiced  Findings: Rash present  No erythema  Neurological:      General: No focal deficit present  Mental Status: He is alert     Psychiatric:         Mood and Affect: Mood normal        Fritz Morejon MD

## 2022-12-27 NOTE — ASSESSMENT & PLAN NOTE
Wart-like skin changes located in the medial portion of the shaft of the penis with several smaller ones as well extending up to the glans of the penis  Patient wishes to get the skin changes removed as soon as possible  - Ambulatory referral for Urology STAT  - Will help patient with scheduling an appointment

## 2022-12-27 NOTE — TELEPHONE ENCOUNTER
Please Triage  New Patient    What is the reason for the patient’s appointment? Anne Pruitt from [place] [address] called to schedule a STAT referral for Penile wart  What office location does the patient prefer? [facility]    Imaging/Lab Results:in epic     Do we accept the patient's insurance or is the patient Self-Pay? Insurance Provider: [employer]   Plan Type/Number:  Member ID#: Has the patient had any previous Urologist(s)? No     Have patient records been requested? If not are records showing in Epic:   In epic  Has the patient had any outside testing done? In [facility]     Does the patient have a personal history of cancer?  No     Anne Pruitt can be reached at [phone]

## 2022-12-27 NOTE — ASSESSMENT & PLAN NOTE
Painful wart-like lesion on the anus  Bleeds on occasions with wiping and as patients reports it is painful to sit  - Referral for colorectal surgery with assistance in scheduling an appointment

## 2022-12-27 NOTE — ASSESSMENT & PLAN NOTE
Patient seen in the ED on 10/30/22 due to a rash on the skin, located in the left lower lumbar region (please see media)  At the time, patient was discharged with Atarax for itching (per chart review)  Patient reports lingering pain in the same region (10/10) since the onset and report the rash has also spread to the shoulder bilaterally  Due to the distribution of the initial rash with stabbing pain that was present in correlation with the picture in the chart, patient possibly had shingles and is currently experiencing post-herpetic neuralgia  - Capsaicin cream topically TID as needed  - Follow up appointment in the office with me in 4 weeks for further management if needed

## 2022-12-27 NOTE — ASSESSMENT & PLAN NOTE
Uncontrolled  Patient reports he wakes up every night with cough and SOB  Reports using his rescue inhaler 4 times daily  - Albuterol refill with nebulizer and supplies    - Prescribed Flovent and will follow up during next office visit

## 2022-12-27 NOTE — TELEPHONE ENCOUNTER
Returned call to [employer] with [employer]  maxrudolph has been issue along with abcesses since December of last year on and off  Appt given for 1/18/22 in our [facility] location with GABBY

## 2022-12-28 ENCOUNTER — TELEPHONE (OUTPATIENT)
Dept: FAMILY MEDICINE CLINIC | Facility: CLINIC | Age: 32
End: 2022-12-28

## 2022-12-28 NOTE — TELEPHONE ENCOUNTER
Pt called and left a msg asking if a BP med will be sent? States he was informed at his appt yesterday it will   Please advise thank you

## 2022-12-30 ENCOUNTER — TELEPHONE (OUTPATIENT)
Dept: CARDIOLOGY CLINIC | Facility: CLINIC | Age: 32
End: 2022-12-30

## 2022-12-30 DIAGNOSIS — I10 HYPERTENSION, UNSPECIFIED TYPE: Primary | ICD-10-CM

## 2022-12-30 RX ORDER — CHLORTHALIDONE 25 MG/1
25 TABLET ORAL DAILY
Qty: 90 TABLET | Refills: 0 | Status: SHIPPED | OUTPATIENT
Start: 2022-12-30

## 2022-12-30 NOTE — TELEPHONE ENCOUNTER
BP medication prescription - Chlorthalidone 25 mg daily  Called patient and informed him of prescription as well as reminded him of upcoming appointment with me on Jan 24/2023  Patient understood and thanked

## 2023-01-04 NOTE — PROGRESS NOTES
Pt presents for limited exam  ASA II  Pt reports 10/10 pain on tooth 31    Tooth is grossly cavitate with pulp involvement  A lot of the tooth structure is broken and tooth is not restorable with RCT/ crown    No swelling   Pt afebrile   E/O and I/O soft tissue and lymph nodes WNL     PT was anxious and apprehensive to have exam done, examination with a mirror and probe made the pt nervous and he expressed that he needs to be sedated for any kind of tx    Explained that we do not offer sedation but can refer him to [facility] where sedation is offered    OTC tylenol and ibuprofen recommended and advised not to go over max limit    PT expressed that he did not complete antibiotics course perscribed int he ED    Offered to renew antibiotics tx and try a different antibiotic since pt mentioned the one given by the ED was not helping him  Pt stated asked if he could smoke marijuana and take antibiotic medication  Advised pt to not mix marijuana and antibiotics   Pt declined prescription so none was prescribed today as per patient's request      Yen Ards pt to go to the ED if he experiences facial swelling, swelling under his tongue, fever, shortness of breath or any systemic symptoms    STAT OS referral given     PT dismissed ambulatory

## 2023-01-09 ENCOUNTER — HOSPITAL ENCOUNTER (EMERGENCY)
Facility: HOSPITAL | Age: 33
Discharge: HOME | End: 2023-01-09
Attending: EMERGENCY MEDICINE

## 2023-01-09 VITALS
DIASTOLIC BLOOD PRESSURE: 87 MMHG | SYSTOLIC BLOOD PRESSURE: 146 MMHG | WEIGHT: 205.2 LBS | TEMPERATURE: 98 F | RESPIRATION RATE: 16 BRPM | HEART RATE: 78 BPM | OXYGEN SATURATION: 98 % | BODY MASS INDEX: 27.07 KG/M2

## 2023-01-09 DIAGNOSIS — K08.89 DENTALGIA: ICD-10-CM

## 2023-01-09 DIAGNOSIS — K04.7 DENTAL INFECTION: Primary | ICD-10-CM

## 2023-01-09 RX ORDER — IBUPROFEN 600 MG/1
600 TABLET ORAL EVERY 6 HOURS PRN
Qty: 20 TABLET | Refills: 0 | Status: SHIPPED | OUTPATIENT
Start: 2023-01-09

## 2023-01-09 RX ORDER — LIDOCAINE HYDROCHLORIDE 20 MG/ML
15 SOLUTION OROPHARYNGEAL ONCE
Status: COMPLETED | OUTPATIENT
Start: 2023-01-09 | End: 2023-01-09

## 2023-01-09 RX ORDER — AMOXICILLIN AND CLAVULANATE POTASSIUM 875; 125 MG/1; MG/1
1 TABLET, FILM COATED ORAL ONCE
Status: COMPLETED | OUTPATIENT
Start: 2023-01-09 | End: 2023-01-09

## 2023-01-09 RX ORDER — AMOXICILLIN AND CLAVULANATE POTASSIUM 875; 125 MG/1; MG/1
1 TABLET, FILM COATED ORAL EVERY 12 HOURS SCHEDULED
Qty: 14 TABLET | Refills: 0 | Status: SHIPPED | OUTPATIENT
Start: 2023-01-09 | End: 2023-01-13

## 2023-01-09 RX ORDER — KETOROLAC TROMETHAMINE 30 MG/ML
15 INJECTION, SOLUTION INTRAMUSCULAR; INTRAVENOUS ONCE
Status: COMPLETED | OUTPATIENT
Start: 2023-01-09 | End: 2023-01-09

## 2023-01-09 RX ADMIN — AMOXICILLIN AND CLAVULANATE POTASSIUM 1 TABLET: 875; 125 TABLET, FILM COATED ORAL at 13:34

## 2023-01-09 RX ADMIN — LIDOCAINE HYDROCHLORIDE 15 ML: 20 SOLUTION ORAL; TOPICAL at 13:34

## 2023-01-09 RX ADMIN — KETOROLAC TROMETHAMINE 15 MG: 30 INJECTION, SOLUTION INTRAMUSCULAR; INTRAVENOUS at 13:33

## 2023-01-09 NOTE — DISCHARGE INSTRUCTIONS
Take antibiotic as prescribed  Follow up with Dentist  Return to ED for new or worsening symptoms as discussed

## 2023-01-09 NOTE — ED PROVIDER NOTES
History  Chief Complaint   Patient presents with   • Dental Pain     Started a week ago, appointment for Friday, bottom tooth pain, goes into throat and ear right sided     33 y o  M with PMH of HIV, asthma, HTN presents to ED c/o recurrent dental pain  Was seen by Dentist, diagnosed with irreversible pulpitis, given oral surgery referral  He states he has an appointment with them on Friday  Last received penicillin VK for this on 11/30/22, did not complete the course  History provided by:  Patient and medical records  Dental Pain  Location:  Lower  Lower teeth location:  31/RL 2nd molar  Quality:  Constant and shooting (when touched, pain shoots up face to ear and to jaw)  Duration:  1 week  Timing:  Constant  Chronicity:  Recurrent  Context: dental caries, intrusion and poor dentition    Previous work-up:  Dental exam  Relieved by:  Nothing  Worsened by:  Touching  Ineffective treatments:  None tried  Associated symptoms: gum swelling (per baseline )    Associated symptoms: no congestion, no difficulty swallowing, no drooling, no facial pain, no facial swelling, no fever, no headaches, no neck pain, no neck swelling, no oral bleeding, no oral lesions and no trismus        Prior to Admission Medications   Prescriptions Last Dose Informant Patient Reported? Taking?    Biktarvy -25 MG tablet   Yes No   Sig: Take 1 tablet by mouth daily   albuterol (PROVENTIL HFA,VENTOLIN HFA) 90 mcg/act inhaler   No No   Sig: Inhale 2 puffs every 4 (four) hours as needed for wheezing   capsaicin (ZOSTRIX) 0 025 % cream   No No   Sig: Apply 587 application topically 3 (three) times a day   chlorthalidone 25 mg tablet   No No   Sig: Take 1 tablet (25 mg total) by mouth daily   fluticasone (Flovent HFA) 44 mcg/act inhaler   No No   Sig: Inhale 2 puffs 2 (two) times a day Rinse mouth after use    hydrOXYzine HCL (ATARAX) 25 mg tablet   No No   Sig: Take 1 tablet (25 mg total) by mouth every 6 (six) hours   ibuprofen (MOTRIN) 400 mg tablet   Yes No   Sig: Take by mouth every 6 (six) hours as needed for mild pain   ibuprofen (MOTRIN) 600 mg tablet   No No   Sig: Take 1 tablet (600 mg total) by mouth every 6 (six) hours as needed for mild pain   ketorolac (TORADOL) 10 mg tablet   No No   Sig: Take 1 tablet (10 mg total) by mouth every 6 (six) hours as needed for moderate pain or severe pain   oxyCODONE-acetaminophen (PERCOCET) 5-325 mg per tablet   Yes No   Sig: Take 1 tablet by mouth every 4 (four) hours as needed for moderate pain   oxyCODONE-acetaminophen (PERCOCET) 5-325 mg per tablet   No No   Sig: Take 1 tablet by mouth every 6 (six) hours as needed for severe pain for up to 10 dosesMax Daily Amount: 4 tablets      Facility-Administered Medications: None       Past Medical History:   Diagnosis Date   • Asthma    • HIV (human immunodeficiency virus infection) ([facility] [address] )    • Hypertension        Past Surgical History:   Procedure Laterality Date   • PATELLA FRACTURE SURGERY Right        History reviewed  No pertinent family history  I have reviewed and agree with the history as documented  E-Cigarette/Vaping   • E-Cigarette Use Never User      E-Cigarette/Vaping Substances     Social History     Tobacco Use   • Smoking status: Every Day     Packs/day: 0 25     Types: Cigarettes   • Smokeless tobacco: Never   Vaping Use   • Vaping Use: Never used   Substance Use Topics   • Alcohol use: Not Currently     Comment: weekends    • Drug use: Yes     Types: Marijuana     Comment: daily       Review of Systems   Constitutional: Negative for chills and fever  HENT: Positive for dental problem and ear pain  Negative for congestion, drooling, ear discharge, facial swelling, hearing loss, mouth sores, rhinorrhea, sinus pain, sore throat, trouble swallowing and voice change  Eyes: Negative for pain, redness and visual disturbance  Respiratory: Negative for choking and shortness of breath  Cardiovascular: Negative for chest pain  Gastrointestinal: Negative for nausea and vomiting  Musculoskeletal: Negative for neck pain and neck stiffness  Skin: Negative for color change and rash  Neurological: Negative for dizziness, numbness and headaches  All other systems reviewed and are negative  Physical Exam  Physical Exam  Vitals and nursing note reviewed  Constitutional:       General: He is not in acute distress  Appearance: Normal appearance  He is well-developed  He is not ill-appearing, toxic-appearing or diaphoretic  HENT:      Head: Normocephalic and atraumatic  Jaw: There is normal jaw occlusion  No trismus, tenderness, swelling, pain on movement or malocclusion  Right Ear: Tympanic membrane, ear canal and external ear normal       Left Ear: Tympanic membrane, ear canal and external ear normal       Nose: Nose normal       Mouth/Throat:      Lips: Pink  No lesions  Mouth: Mucous membranes are moist  No oral lesions or angioedema  Dentition: Abnormal dentition  Dental tenderness and gingival swelling present  No dental abscesses or gum lesions  Tongue: No lesions  Palate: No lesions  Pharynx: Oropharynx is clear  Uvula midline  No pharyngeal swelling, oropharyngeal exudate, posterior oropharyngeal erythema or uvula swelling  Tonsils: No tonsillar exudate or tonsillar abscesses  Comments: No pseudomembrane   Eyes:      General:         Right eye: No discharge  Left eye: No discharge  Extraocular Movements: Extraocular movements intact  Conjunctiva/sclera: Conjunctivae normal       Pupils: Pupils are equal, round, and reactive to light  Neck:      Trachea: Phonation normal       Comments: Patient maintaining airway and secretions  No stridor   No brawniness under tongue  Cardiovascular:      Rate and Rhythm: Normal rate and regular rhythm  Pulmonary:      Effort: Pulmonary effort is normal  No respiratory distress  Breath sounds: No stridor  Musculoskeletal:      Cervical back: Full passive range of motion without pain  Lymphadenopathy:      Cervical: No cervical adenopathy  Comments: Head lymph nodes WNL    Skin:     General: Skin is warm and dry  Capillary Refill: Capillary refill takes less than 2 seconds  Findings: No erythema or rash  Neurological:      Mental Status: He is alert  Psychiatric:         Behavior: Behavior is cooperative  Vital Signs  ED Triage Vitals [01/09/23 1245]   Temperature Pulse Respirations Blood Pressure SpO2   98 °F (36 7 °C) 78 16 146/87 98 %      Temp Source Heart Rate Source Patient Position - Orthostatic VS BP Location FiO2 (%)   Tympanic Monitor Sitting Right arm --      Pain Score       10 - Worst Possible Pain           Vitals:    01/09/23 1245   BP: 146/87   Pulse: 78   Patient Position - Orthostatic VS: Sitting         Visual Acuity      ED Medications  Medications   amoxicillin-clavulanate (AUGMENTIN) 875-125 mg per tablet 1 tablet (1 tablet Oral Given 1/9/23 1334)   ketorolac (TORADOL) injection 15 mg (15 mg Intramuscular Given 1/9/23 1333)   Lidocaine Viscous HCl (XYLOCAINE) 2 % mucosal solution 15 mL (15 mL Swish & Spit Given 1/9/23 1334)       Diagnostic Studies  Results Reviewed     None                 No orders to display              Procedures  Procedures         ED Course                                             Medical Decision Making  Vital signs stable  Afebrile  Positive tooth tapping tenderness  Gingival swelling noted  No gum lesions  No abscesses noted  Overall poor dentition  No systemic symptoms  No difficulty swallowing, breathing  No trismus  No voice changes  No facial swelling  Low concern for deep space infection at this time  This was previously evaluated by Dentist  He has follow up  He did not complete previous course of antibiotic   Plan to treat with new antibiotic, pain control, with close dental follow-up        strict return to ED precautions discussed  Patient recommended to follow up promptly with appropriate outpatient provider  Patient and/or family members verbalizes understanding and agrees with plan  Patient and/or family members were given opportunity to ask questions, all questions were answered at this time  Patient is stable for discharge      Portions of the record may have been created with voice recognition software  Occasional wrong word or "sound a like" substitutions may have occurred due to the inherent limitations of voice recognition software  Read the chart carefully and recognize, using context, where substitutions have occurred  Dental infection: complicated acute illness or injury  Dentalgia: complicated acute illness or injury  Risk  OTC drugs  Prescription drug management  Disposition  Final diagnoses:   Dental infection   Dentalgia     Time reflects when diagnosis was documented in both MDM as applicable and the Disposition within this note     Time User Action Codes Description Comment    1/9/2023  1:24 PM Nicolás Peters Add [K04 7] Dental infection     1/9/2023  1:24 PM Nicolás Peters Add [K08 89] [address] [address]       ED Disposition     ED Disposition   Discharge    Condition   Stable    Date/Time   Mon Jan 9, 2023  1:27 PM    [address] [facility] discharge to home/self care                 Follow-up Information     Follow up With Specialties Details Why Contact Info Additional [address] [facility]   [address] [id number] [address] [facility] [address], [address], [address], [address] [facility] Schedule an appointment as soon as possible for a visit in 1 day For follow up regarding your symptoms [place] [id number]  [address], [address] [place], [place], [address], [phone] Discharge Medication List as of 1/9/2023  1:35 PM      START taking these medications    Details   amoxicillin-clavulanate (AUGMENTIN) 875-125 mg per tablet Take 1 tablet by mouth every 12 (twelve) hours for 7 days, Starting Mon 1/9/2023, Until Mon 1/16/2023, Normal      benzocaine (ORAJEL) 10 % mucosal gel Apply 1 application to the mouth or throat as needed for mucositis, Starting Mon 1/9/2023, Normal      !! ibuprofen (MOTRIN) 600 mg tablet Take 1 tablet (600 mg total) by mouth every 6 (six) hours as needed for mild pain or moderate pain, Starting Mon 1/9/2023, Normal       !! - Potential duplicate medications found  Please discuss with provider        CONTINUE these medications which have NOT CHANGED    Details   !! ibuprofen (MOTRIN) 600 mg tablet Take 1 tablet (600 mg total) by mouth every 6 (six) hours as needed for mild pain, Starting Wed 11/30/2022, Print      albuterol (PROVENTIL HFA,VENTOLIN HFA) 90 mcg/act inhaler Inhale 2 puffs every 4 (four) hours as needed for wheezing, Starting Tue 12/27/2022, Normal      Biktarvy -25 MG tablet Take 1 tablet by mouth daily, Starting Fri 12/2/2022, Historical Med      capsaicin (ZOSTRIX) 0 025 % cream Apply 826 application topically 3 (three) times a day, Starting Tue 12/27/2022, Normal      chlorthalidone 25 mg tablet Take 1 tablet (25 mg total) by mouth daily, Starting Fri 12/30/2022, Normal      fluticasone (Flovent HFA) 44 mcg/act inhaler Inhale 2 puffs 2 (two) times a day Rinse mouth after use , Starting Tue 12/27/2022, Normal      hydrOXYzine HCL (ATARAX) 25 mg tablet Take 1 tablet (25 mg total) by mouth every 6 (six) hours, Starting Sun 10/30/2022, Normal      !! ibuprofen (MOTRIN) 400 mg tablet Take by mouth every 6 (six) hours as needed for mild pain, Historical Med      ketorolac (TORADOL) 10 mg tablet Take 1 tablet (10 mg total) by mouth every 6 (six) hours as needed for moderate pain or severe pain, Starting Sun 5/2/2021, Normal      !! oxyCODONE-acetaminophen (PERCOCET) 5-325 mg per tablet Take 1 tablet by mouth every 4 (four) hours as needed for moderate pain, Historical Med      !! oxyCODONE-acetaminophen (PERCOCET) 5-325 mg per tablet Take 1 tablet by mouth every 6 (six) hours as needed for severe pain for up to 10 dosesMax Daily Amount: 4 tablets, Starting Sun 5/2/2021, Normal       !! - Potential duplicate medications found  Please discuss with provider  No discharge procedures on file      PDMP Review     None          ED Provider  Electronically Signed by           Delon Bernardo PA-C  01/09/23 1826

## 2023-01-11 ENCOUNTER — HOSPITAL ENCOUNTER (EMERGENCY)
Facility: HOSPITAL | Age: 33
Discharge: HOME | End: 2023-01-11
Attending: EMERGENCY MEDICINE

## 2023-01-11 VITALS
DIASTOLIC BLOOD PRESSURE: 104 MMHG | BODY MASS INDEX: 27.64 KG/M2 | HEART RATE: 80 BPM | OXYGEN SATURATION: 100 % | RESPIRATION RATE: 18 BRPM | TEMPERATURE: 97.2 F | SYSTOLIC BLOOD PRESSURE: 170 MMHG | WEIGHT: 209.5 LBS

## 2023-01-11 VITALS
WEIGHT: 204.6 LBS | TEMPERATURE: 97.2 F | BODY MASS INDEX: 26.99 KG/M2 | DIASTOLIC BLOOD PRESSURE: 79 MMHG | SYSTOLIC BLOOD PRESSURE: 118 MMHG | HEART RATE: 65 BPM | RESPIRATION RATE: 16 BRPM | OXYGEN SATURATION: 98 %

## 2023-01-11 DIAGNOSIS — T78.40XA ACUTE ALLERGIC REACTION, INITIAL ENCOUNTER: Primary | ICD-10-CM

## 2023-01-11 DIAGNOSIS — K02.9 DENTAL CARIES: ICD-10-CM

## 2023-01-11 DIAGNOSIS — K08.89 DENTALGIA: Primary | ICD-10-CM

## 2023-01-11 RX ORDER — KETOROLAC TROMETHAMINE 30 MG/ML
15 INJECTION, SOLUTION INTRAMUSCULAR; INTRAVENOUS ONCE
Status: COMPLETED | OUTPATIENT
Start: 2023-01-11 | End: 2023-01-11

## 2023-01-11 RX ORDER — METHYLPREDNISOLONE SODIUM SUCCINATE 125 MG/2ML
125 INJECTION, POWDER, LYOPHILIZED, FOR SOLUTION INTRAMUSCULAR; INTRAVENOUS ONCE
Status: COMPLETED | OUTPATIENT
Start: 2023-01-11 | End: 2023-01-11

## 2023-01-11 RX ORDER — KETOROLAC TROMETHAMINE 10 MG/1
10 TABLET, FILM COATED ORAL EVERY 6 HOURS PRN
Qty: 20 TABLET | Refills: 0 | Status: SHIPPED | OUTPATIENT
Start: 2023-01-11

## 2023-01-11 RX ORDER — EPINEPHRINE 1 MG/ML
0.3 INJECTION, SOLUTION, CONCENTRATE INTRAVENOUS ONCE
Status: COMPLETED | OUTPATIENT
Start: 2023-01-11 | End: 2023-01-11

## 2023-01-11 RX ORDER — PREDNISONE 20 MG/1
40 TABLET ORAL DAILY
Qty: 10 TABLET | Refills: 0 | Status: SHIPPED | OUTPATIENT
Start: 2023-01-11 | End: 2023-01-16

## 2023-01-11 RX ORDER — OXYCODONE HYDROCHLORIDE AND ACETAMINOPHEN 5; 325 MG/1; MG/1
1 TABLET ORAL ONCE
Status: COMPLETED | OUTPATIENT
Start: 2023-01-11 | End: 2023-01-11

## 2023-01-11 RX ORDER — FAMOTIDINE 10 MG/ML
INJECTION, SOLUTION INTRAVENOUS
Status: DISCONTINUED
Start: 2023-01-11 | End: 2023-01-12 | Stop reason: HOSPADM

## 2023-01-11 RX ORDER — EPINEPHRINE 0.3 MG/.3ML
0.3 INJECTION SUBCUTANEOUS ONCE
Qty: 0.6 ML | Refills: 0 | Status: SHIPPED | OUTPATIENT
Start: 2023-01-11 | End: 2023-01-11

## 2023-01-11 RX ORDER — FAMOTIDINE 10 MG/ML
40 INJECTION, SOLUTION INTRAVENOUS ONCE
Status: COMPLETED | OUTPATIENT
Start: 2023-01-11 | End: 2023-01-11

## 2023-01-11 RX ADMIN — EPINEPHRINE 0.3 MG: 1 INJECTION, SOLUTION, CONCENTRATE INTRAVENOUS at 19:07

## 2023-01-11 RX ADMIN — FAMOTIDINE 40 MG: 10 INJECTION INTRAVENOUS at 19:21

## 2023-01-11 RX ADMIN — METHYLPREDNISOLONE SODIUM SUCCINATE 125 MG: 125 INJECTION, POWDER, FOR SOLUTION INTRAMUSCULAR; INTRAVENOUS at 19:20

## 2023-01-11 RX ADMIN — ALBUTEROL SULFATE 2.5 MG: 2.5 SOLUTION RESPIRATORY (INHALATION) at 19:18

## 2023-01-11 RX ADMIN — KETOROLAC TROMETHAMINE 15 MG: 30 INJECTION, SOLUTION INTRAMUSCULAR; INTRAVENOUS at 01:36

## 2023-01-11 RX ADMIN — OXYCODONE HYDROCHLORIDE AND ACETAMINOPHEN 1 TABLET: 5; 325 TABLET ORAL at 01:36

## 2023-01-11 NOTE — ED PROVIDER NOTES
History  Chief Complaint   Patient presents with   • Dental Pain     R bottom tooth pain  Was seen recently and given abx and motrin, states motrin is not relieving the pain  Pain to R ear and jaw  Patient presents for an evaluation of R lower dental pain ongoing for the past several months  Worse tonight  He has a dentist appointment in 3 days  He was last seen by his dentist at the beginning of the month where he was offered to have tooth fixed, but patient refused at the time  He is currently on abx  States ibuprofen is not working at home  Denies any facial swelling, fevers, chills, vomiting  Pain radiates across lower jaw into ear  No other complaints  Prior to Admission Medications   Prescriptions Last Dose Informant Patient Reported? Taking?    Biktarvy -25 MG tablet   Yes No   Sig: Take 1 tablet by mouth daily   albuterol (PROVENTIL HFA,VENTOLIN HFA) 90 mcg/act inhaler   No No   Sig: Inhale 2 puffs every 4 (four) hours as needed for wheezing   amoxicillin-clavulanate (AUGMENTIN) 875-125 mg per tablet   No No   Sig: Take 1 tablet by mouth every 12 (twelve) hours for 7 days   benzocaine (ORAJEL) 10 % mucosal gel   No No   Sig: Apply 1 application to the mouth or throat as needed for mucositis   capsaicin (ZOSTRIX) 0 025 % cream   No No   Sig: Apply 713 application topically 3 (three) times a day   chlorthalidone 25 mg tablet   No No   Sig: Take 1 tablet (25 mg total) by mouth daily   fluticasone (Flovent HFA) 44 mcg/act inhaler   No No   Sig: Inhale 2 puffs 2 (two) times a day Rinse mouth after use    hydrOXYzine HCL (ATARAX) 25 mg tablet   No No   Sig: Take 1 tablet (25 mg total) by mouth every 6 (six) hours   ibuprofen (MOTRIN) 400 mg tablet   Yes No   Sig: Take by mouth every 6 (six) hours as needed for mild pain   ibuprofen (MOTRIN) 600 mg tablet   No No   Sig: Take 1 tablet (600 mg total) by mouth every 6 (six) hours as needed for mild pain   ibuprofen (MOTRIN) 600 mg tablet   No No Sig: Take 1 tablet (600 mg total) by mouth every 6 (six) hours as needed for mild pain or moderate pain   ketorolac (TORADOL) 10 mg tablet   No No   Sig: Take 1 tablet (10 mg total) by mouth every 6 (six) hours as needed for moderate pain or severe pain   oxyCODONE-acetaminophen (PERCOCET) 5-325 mg per tablet   Yes No   Sig: Take 1 tablet by mouth every 4 (four) hours as needed for moderate pain   oxyCODONE-acetaminophen (PERCOCET) 5-325 mg per tablet   No No   Sig: Take 1 tablet by mouth every 6 (six) hours as needed for severe pain for up to 10 dosesMax Daily Amount: 4 tablets      Facility-Administered Medications: None       Past Medical History:   Diagnosis Date   • Asthma    • [facility]    • Hypertension        Past Surgical History:   Procedure Laterality Date   • PATELLA FRACTURE SURGERY Right        History reviewed  No pertinent family history  I have reviewed and agree with the history as documented  E-Cigarette/Vaping   • E-Cigarette Use Never User      E-Cigarette/Vaping Substances     Social History     Tobacco Use   • Smoking status: Every Day     Packs/day: 0 25     Types: Cigarettes   • Smokeless tobacco: Never   Vaping Use   • Vaping Use: Never used   Substance Use Topics   • Alcohol use: Not Currently     Comment: weekends    • Drug use: Yes     Types: Marijuana     Comment: daily       Review of Systems   Constitutional: Negative for chills and fever  HENT: Positive for dental problem  Negative for congestion, ear pain and sore throat  Eyes: Negative for pain  Respiratory: Negative for cough and shortness of breath  Cardiovascular: Negative for chest pain  Gastrointestinal: Negative for abdominal pain, nausea and vomiting  Genitourinary: Negative for dysuria  Musculoskeletal: Negative for back pain  Skin: Negative for rash  Neurological: Negative for dizziness, weakness and numbness     Psychiatric/Behavioral: Negative for suicidal ideas  All other systems reviewed and are negative  Physical Exam  Physical Exam  Vitals reviewed  Constitutional:       General: He is not in acute distress  Appearance: He is well-developed  He is not diaphoretic  HENT:      Head: Normocephalic and atraumatic  Right Ear: External ear normal       Left Ear: External ear normal       Nose: Nose normal       Mouth/Throat:      Dentition: Abnormal dentition  Dental tenderness, gingival swelling and dental caries present  No dental abscesses  Comments: Very poor dentition noted throughout with multiple teeth eroded to gumline  Gingival swelling noted  No abscess appreciated  Eyes:      Pupils: Pupils are equal, round, and reactive to light  Cardiovascular:      Rate and Rhythm: Normal rate and regular rhythm  Heart sounds: Normal heart sounds  Pulmonary:      Effort: Pulmonary effort is normal       Breath sounds: Normal breath sounds  Abdominal:      General: Bowel sounds are normal       Palpations: Abdomen is soft  Tenderness: There is no abdominal tenderness  Musculoskeletal:         General: Normal range of motion  Cervical back: Normal range of motion and neck supple  Skin:     General: Skin is warm and dry  Neurological:      Mental Status: He is alert and oriented to person, place, and time           Vital Signs  ED Triage Vitals [01/11/23 0121]   Temperature Pulse Respirations Blood Pressure SpO2   (!) 97 2 °F (36 2 °C) 80 18 (!) 170/104 100 %      Temp Source Heart Rate Source Patient Position - Orthostatic VS BP Location FiO2 (%)   Tympanic Monitor Sitting Right arm --      Pain Score       10 - Worst Possible Pain           Vitals:    01/11/23 0121   BP: (!) 170/104   Pulse: 80   Patient Position - Orthostatic VS: Sitting         Visual Acuity      ED Medications  Medications   oxyCODONE-acetaminophen (PERCOCET) 5-325 mg per tablet 1 tablet (1 tablet Oral Given 1/11/23 0136)   ketorolac (TORADOL) injection 15 mg (15 mg Intramuscular Given 1/11/23 0136)       Diagnostic Studies  Results Reviewed     None                 No orders to display              Procedures  Procedures         ED Course                               SBIRT 22yo+    Flowsheet Row Most Recent Value   SBIRT (23 yo +)    In order to provide better care to our patients, we are screening all of our patients for alcohol and drug use  Would it be okay to ask you these screening questions? No Filed at: 01/11/2023 0139                    Medical Decision Making  Patient with ongoing dental pain  He has an appointment in 3 days with dentist  He is currently on abx  No abscess/ facial swelling  Will provide with additional analgesia  Emphasized importance of follow up with dentist  Patient agreeable    Dental caries: acute illness or injury  Dentalgia: acute illness or injury  Risk  OTC drugs  Prescription drug management  Disposition  Final diagnoses:   Dentalgia   Dental caries     Time reflects when diagnosis was documented in both MDM as applicable and the Disposition within this note     Time User Action Codes Description Comment    1/11/2023  1:45 AM Sheila Riddle Add [K08 89] Dentalgia     1/11/2023  1:45 AM Sheila Riddle Add [K02 9] Dental caries       ED Disposition     ED Disposition   Discharge    Condition   Stable    Date/Time   Wed Jan 11, 2023  1:45 AM    Comment   [place] discharge to home/self care                 Follow-up Information     Follow up With Specialties Details Why [address] [address] [phone]          Patient's Medications   Discharge Prescriptions    BENZOCAINE (ORAJEL) 10 % MUCOSAL GEL    Apply 1 application to the mouth or throat 3 (three) times a day as needed for mucositis       Start Date: 1/11/2023 End Date: --       Order Dose: 1 application       Quantity: 5 3 g    Refills: 0    KETOROLAC (TORADOL) 10 MG TABLET Take 1 tablet (10 mg total) by mouth every 6 (six) hours as needed for moderate pain       Start Date: 1/11/2023 End Date: --       Order Dose: 10 mg       Quantity: 20 tablet    Refills: 0       No discharge procedures on file      PDMP Review     None          ED Provider  Electronically Signed by           Alberto Holloway PA-C  01/11/23 7153

## 2023-01-11 NOTE — DISCHARGE INSTRUCTIONS
Please ensure you follow up with your dentist appointment on Friday  Continue using your medications at home as prescribed  Please return to the ER with any worsening symptoms

## 2023-01-12 NOTE — DISCHARGE INSTRUCTIONS
Take steroid as prescribed   epipen immediately  Avoid pomegranates  Follow up with Allergist, PCP  Return to ED for new or worsening symptoms as discussed

## 2023-01-12 NOTE — ED PROVIDER NOTES
History  Chief Complaint   Patient presents with   • Allergic Reaction     Allergic reaction, unknown what it is from, no new medications or anything, he stated he was cleaning something and sweeping but he does not really think he took anything to be allergic too      33 y o  M with PMH of HIV, asthma, HTN presents to ED c/o allergic reaction  He was recently seen in the ED for dental infection  He denies any change in dental pain  Denies change in gum swelling  Denies trismus, drooling, fever, chills, facial swelling besides eyes/lip, neck swelling, abscess, drainage  He states he has been taking 3 days of the augmentin and 1 day of the toradol  But he has gotten toradol multiple times in the past and taken penicillins in the past without allergy  He takes chlorthalidone for BP, denies ACEI use  He states he did not use his daily asthma inhaler yet today  History provided by:  Patient and medical records  Allergic Reaction  Presenting symptoms: swelling    Presenting symptoms: no difficulty breathing, no difficulty swallowing, no itching and no rash  Wheezing: he denies feeling sob or like he is wheezing, however wheezing heard     Swelling:     Location: B/L eyes and feels like bottom lip is starting to swell   denies swelling elsewhere including tongue     Onset quality:  Sudden    Timing:  Constant    Progression:  Worsening (started with only L eye, then right eye and lip )    Chronicity:  New  Duration:  30 minutes  Prior allergic episodes:  Seasonal allergies and food/nut allergies (seafood allergy, red food dye allergy )  Context: food (pomogranate ) and medications (has been taking augmentin, toradol)    Context: not animal exposure, not chemicals, not cosmetics, not insect bite/sting and not new detergents/soaps    Relieved by:  Nothing  Worsened by:  Nothing  Ineffective treatments:  Antihistamines (took 2 benadryl PTA )      Prior to Admission Medications   Prescriptions Last Dose Informant Patient Reported? Taking?    Biktarvy -25 MG tablet   Yes No   Sig: Take 1 tablet by mouth daily   albuterol (PROVENTIL HFA,VENTOLIN HFA) 90 mcg/act inhaler   No No   Sig: Inhale 2 puffs every 4 (four) hours as needed for wheezing   benzocaine (ORAJEL) 10 % mucosal gel   No No   Sig: Apply 1 application to the mouth or throat as needed for mucositis   benzocaine (ORAJEL) 10 % mucosal gel   No No   Sig: Apply 1 application to the mouth or throat 3 (three) times a day as needed for mucositis   capsaicin (ZOSTRIX) 0 025 % cream   No No   Sig: Apply 727 application topically 3 (three) times a day   chlorthalidone 25 mg tablet   No No   Sig: Take 1 tablet (25 mg total) by mouth daily   fluticasone (Flovent HFA) 44 mcg/act inhaler   No No   Sig: Inhale 2 puffs 2 (two) times a day Rinse mouth after use    hydrOXYzine HCL (ATARAX) 25 mg tablet   No No   Sig: Take 1 tablet (25 mg total) by mouth every 6 (six) hours   ibuprofen (MOTRIN) 400 mg tablet   Yes No   Sig: Take by mouth every 6 (six) hours as needed for mild pain   ibuprofen (MOTRIN) 600 mg tablet   No No   Sig: Take 1 tablet (600 mg total) by mouth every 6 (six) hours as needed for mild pain   ibuprofen (MOTRIN) 600 mg tablet   No No   Sig: Take 1 tablet (600 mg total) by mouth every 6 (six) hours as needed for mild pain or moderate pain   ketorolac (TORADOL) 10 mg tablet   No No   Sig: Take 1 tablet (10 mg total) by mouth every 6 (six) hours as needed for moderate pain or severe pain   ketorolac (TORADOL) 10 mg tablet   No No   Sig: Take 1 tablet (10 mg total) by mouth every 6 (six) hours as needed for moderate pain   oxyCODONE-acetaminophen (PERCOCET) 5-325 mg per tablet   Yes No   Sig: Take 1 tablet by mouth every 4 (four) hours as needed for moderate pain   oxyCODONE-acetaminophen (PERCOCET) 5-325 mg per tablet   No No   Sig: Take 1 tablet by mouth every 6 (six) hours as needed for severe pain for up to 10 dosesMax Daily Amount: 4 tablets Facility-Administered Medications: None       Past Medical History:   Diagnosis Date   • Asthma    • HIV (human immunodeficiency virus infection) ([facility] )    • Hypertension        Past Surgical History:   Procedure Laterality Date   • PATELLA FRACTURE SURGERY Right        History reviewed  No pertinent family history  I have reviewed and agree with the history as documented  E-Cigarette/Vaping   • E-Cigarette Use Never User      E-Cigarette/Vaping Substances     Social History     Tobacco Use   • Smoking status: Every Day     Packs/day: 0 25     Types: Cigarettes   • Smokeless tobacco: Never   Vaping Use   • Vaping Use: Never used   Substance Use Topics   • Alcohol use: Not Currently     Comment: weekends    • Drug use: Yes     Types: Marijuana     Comment: daily       Review of Systems   Constitutional: Negative for chills and fever  HENT: Positive for dental problem  Negative for sore throat, trouble swallowing and voice change  Eyes: Positive for itching  Negative for photophobia, pain, discharge, redness and visual disturbance  Periorbital edema    Respiratory: Negative for cough, choking, chest tightness and shortness of breath  Wheezing: he denies feeling sob or like he is wheezing, however wheezing heard     Cardiovascular: Negative for chest pain, palpitations and leg swelling  Gastrointestinal: Negative for abdominal pain, diarrhea, nausea and vomiting  Musculoskeletal: Negative for joint swelling, neck pain and neck stiffness  Skin: Negative for color change, itching and rash  Neurological: Negative for dizziness, syncope, weakness and headaches  Psychiatric/Behavioral: Negative for confusion  All other systems reviewed and are negative  Physical Exam  Physical Exam  Vitals and nursing note reviewed  Constitutional:       General: He is not in acute distress  Appearance: Normal appearance  He is not ill-appearing, toxic-appearing or diaphoretic     HENT:      Head: Normocephalic and atraumatic  No right periorbital erythema or left periorbital erythema  Jaw: There is normal jaw occlusion  No trismus, tenderness, swelling or pain on movement  Comments: B/L periorbital edema L>R  No other facial swelling     Nose: Nose normal  No mucosal edema  Mouth/Throat:      Lips: Pink  Mouth: Mucous membranes are moist  No oral lesions  Dentition: Abnormal dentition (unchanged from previous visit )  Dental tenderness, gingival swelling and dental caries present  No dental abscesses or gum lesions  Pharynx: Oropharynx is clear  Uvula midline  Uvula swelling present  No pharyngeal swelling, oropharyngeal exudate or posterior oropharyngeal erythema  Tonsils: No tonsillar exudate or tonsillar abscesses  Comments: Mild swelling of lower lip noted on arrival  Uvula swelling noted  No tongue swelling  Patient maintaining airway and secretions  No stridor   No brawniness under tongue  Eyes:      General:         Right eye: No discharge  Left eye: No discharge  Extraocular Movements: Extraocular movements intact  Conjunctiva/sclera: Conjunctivae normal       Pupils: Pupils are equal, round, and reactive to light  Neck:      Trachea: Phonation normal    Cardiovascular:      Rate and Rhythm: Normal rate and regular rhythm  Pulses: Normal pulses  Heart sounds: Normal heart sounds  Pulmonary:      Effort: Pulmonary effort is normal  No respiratory distress  Breath sounds: No stridor  Wheezing present  No rhonchi or rales  Comments: Patient in no respiratory distress, speaking in full sentences, managing oral secretions without difficulty, no accessory muscle use, retractions, or belly breathing noted, no focal lung sounds B/L  Chest:      Chest wall: No tenderness  Abdominal:      General: There is no distension  Palpations: Abdomen is soft  Tenderness: There is no abdominal tenderness  Musculoskeletal:      Cervical back: Neck supple  No edema  Skin:     General: Skin is warm and dry  Findings: No rash (no urticaria noted )  Neurological:      Mental Status: He is alert  Vital Signs  ED Triage Vitals [01/11/23 1845]   Temperature Pulse Respirations Blood Pressure SpO2   (!) 97 2 °F (36 2 °C) 85 18 (!) 164/101 97 %      Temp Source Heart Rate Source Patient Position - Orthostatic VS BP Location FiO2 (%)   Tympanic Monitor Sitting Left arm --      Pain Score       --           Vitals:    01/11/23 1845 01/11/23 2215   BP: (!) 164/101 118/79   Pulse: 85 65   Patient Position - Orthostatic VS: Sitting Lying         Visual Acuity      ED Medications  Medications   EPINEPHrine PF (ADRENALIN) 1 mg/mL injection 0 3 mg (0 3 mg Intramuscular Given 1/11/23 1907)   methylPREDNISolone sodium succinate (Solu-MEDROL) injection 125 mg (125 mg Intravenous Given 1/11/23 1920)   Famotidine (PF) (PEPCID) injection 40 mg (40 mg Intravenous Given 1/11/23 1921)   albuterol inhalation solution 2 5 mg (2 5 mg Nebulization Given 1/11/23 1918)       Diagnostic Studies  Results Reviewed     None                 No orders to display              Procedures  Procedures         ED Course  ED Course as of 01/13/23 2311 Wed Jan 11, 2023 1859 Patient took 50 mg of benadryl 45 minutes PTA     1859 Wheezing and angioedema noted    1904 Was sweeping when it started  Had also recently taken his augmentin and toradol  Has been taking them for 2 days  1917 Lip and uvula swelling improved  Wheezing still present  1930 Wheezing resolved    2029 Lungs CTA  B/l eye swelling mildly improved  No worsening of symptoms   2115 Per chart review, patient has taken penicillin , toradol, and percocet all without allergic reaction  2230 Patient requesting food  No return or worsening of symptoms  B/l eyelid swelling continuing to improve  Lungs CTA      2230 Patient mentioned that just prior to symptom onset he ate a pomegranate  He is unsure of the last time he had one  SBIRT 22yo+    Flowsheet Row Most Recent Value   SBIRT (25 yo +)    In order to provide better care to our patients, we are screening all of our patients for alcohol and drug use  Would it be okay to ask you these screening questions? No Filed at: 01/11/2023 6984                    Medical Decision Making  Patient presented for allergic reaction  Angioedema and wheezing noted  Benadryl was taking at home  Epinephrine ordered  Pepcid, solumedrol ordered  Improvement with medications noted, oropharyngeal swelling resolved, no worsening of symptoms  Did not require repeat epinephrine dose  Wheezing resolved  Medications taken prior to onset of symptoms were reviewed, penicillins nor toradol are new to patient and have not previously caused allergic reaction  Observed, no return or worsening of symptoms  Will discharge with epipen, course of steroids  Allergist f/u given  All imaging and/or lab testing discussed with patient, strict return to ED precautions discussed  Patient recommended to follow up promptly with appropriate outpatient provider  Patient and/or family members verbalizes understanding and agrees with plan  Patient and/or family members were given opportunity to ask questions, all questions were answered at this time  Patient is stable for discharge      Portions of the record may have been created with voice recognition software  Occasional wrong word or "sound a like" substitutions may have occurred due to the inherent limitations of voice recognition software  Read the chart carefully and recognize, using context, where substitutions have occurred  Acute allergic reaction, initial encounter: acute illness or injury  Risk  Prescription drug management            Disposition  Final diagnoses:   Acute allergic reaction, initial encounter     Time reflects when diagnosis was documented in both MDM as applicable and the Disposition within this note     Time User Action Codes Description Comment    1/11/2023  7:11 PM Wilfred Geneva Add [T78 [id number]] Acute allergic reaction, initial encounter       ED Disposition     ED Disposition   Discharge    Condition   Stable    Date/Time   Wed Jan 11, 2023 10:42 PM    [facility] discharge to home/self care                 Follow-up Information     Follow up With Specialties Details Why Contact Info Additional Information    Manpreet Brand MD Family Medicine Schedule an appointment as soon as possible for a visit  For follow up regarding your symptoms [facility] [address] [place] [facility] Allergy   [address] [facility],  [address] [facility] [id number] [facility], [address] [facility] [address] [place], [address]          Discharge Medication List as of 1/11/2023 10:42 PM      START taking these medications    Details   EPINEPHrine (EPIPEN) 0 3 mg/0 3 mL SOAJ Inject 0 3 mL (0 3 mg total) into a muscle once for 1 dose, Starting Wed 1/11/2023, Normal      predniSONE 20 mg tablet Take 2 tablets (40 mg total) by mouth daily for 5 days, Starting Wed 1/11/2023, Until Mon 1/16/2023, Normal         CONTINUE these medications which have NOT CHANGED    Details   albuterol (PROVENTIL HFA,VENTOLIN HFA) 90 mcg/act inhaler Inhale 2 puffs every 4 (four) hours as needed for wheezing, Starting Tue 12/27/2022, Normal      !! benzocaine (ORAJEL) 10 % mucosal gel Apply 1 application to the mouth or throat as needed for mucositis, Starting Mon 1/9/2023, Normal      !! benzocaine (ORAJEL) 10 % mucosal gel Apply 1 application to the mouth or throat 3 (three) times a day as needed for mucositis, Starting Wed 1/11/2023, Normal      Biktarvy -25 MG tablet Take 1 tablet by mouth daily, Starting Fri 12/2/2022, Historical Med capsaicin (ZOSTRIX) 0 025 % cream Apply 929 application topically 3 (three) times a day, Starting Tue 12/27/2022, Normal      chlorthalidone 25 mg tablet Take 1 tablet (25 mg total) by mouth daily, Starting Fri 12/30/2022, Normal      fluticasone (Flovent HFA) 44 mcg/act inhaler Inhale 2 puffs 2 (two) times a day Rinse mouth after use , Starting Tue 12/27/2022, Normal      hydrOXYzine HCL (ATARAX) 25 mg tablet Take 1 tablet (25 mg total) by mouth every 6 (six) hours, Starting Sun 10/30/2022, Normal      !! ibuprofen (MOTRIN) 400 mg tablet Take by mouth every 6 (six) hours as needed for mild pain, Historical Med      !! ibuprofen (MOTRIN) 600 mg tablet Take 1 tablet (600 mg total) by mouth every 6 (six) hours as needed for mild pain, Starting Wed 11/30/2022, Print      !! ibuprofen (MOTRIN) 600 mg tablet Take 1 tablet (600 mg total) by mouth every 6 (six) hours as needed for mild pain or moderate pain, Starting Mon 1/9/2023, Normal      !! ketorolac (TORADOL) 10 mg tablet Take 1 tablet (10 mg total) by mouth every 6 (six) hours as needed for moderate pain or severe pain, Starting Sun 5/2/2021, Normal      !! ketorolac (TORADOL) 10 mg tablet Take 1 tablet (10 mg total) by mouth every 6 (six) hours as needed for moderate pain, Starting Wed 1/11/2023, Normal      !! oxyCODONE-acetaminophen (PERCOCET) 5-325 mg per tablet Take 1 tablet by mouth every 4 (four) hours as needed for moderate pain, Historical Med      !! oxyCODONE-acetaminophen (PERCOCET) 5-325 mg per tablet Take 1 tablet by mouth every 6 (six) hours as needed for severe pain for up to 10 dosesMax Daily Amount: 4 tablets, Starting Sun 5/2/2021, Normal      amoxicillin-clavulanate (AUGMENTIN) 875-125 mg per tablet Take 1 tablet by mouth every 12 (twelve) hours for 7 days, Starting Mon 1/9/2023, Until Mon 1/16/2023, Normal       !! - Potential duplicate medications found  Please discuss with provider                PDMP Review     None          ED Provider  Electronically Signed by           Jj Trevino PA-C  01/13/23 0818

## 2023-01-13 ENCOUNTER — OFFICE VISIT (OUTPATIENT)
Dept: DENTISTRY | Facility: CLINIC | Age: 33
End: 2023-01-13

## 2023-01-13 VITALS — TEMPERATURE: 96.8 F | SYSTOLIC BLOOD PRESSURE: 134 MMHG | DIASTOLIC BLOOD PRESSURE: 84 MMHG | HEART RATE: 69 BPM

## 2023-01-13 DIAGNOSIS — K04.01 ENDODONTITIS: Primary | ICD-10-CM

## 2023-01-13 RX ORDER — METRONIDAZOLE 500 MG/1
500 TABLET ORAL EVERY 8 HOURS SCHEDULED
Qty: 21 TABLET | Refills: 0 | Status: SHIPPED | OUTPATIENT
Start: 2023-01-13 | End: 2023-01-17 | Stop reason: ALTCHOICE

## 2023-01-16 ENCOUNTER — HOSPITAL ENCOUNTER (EMERGENCY)
Facility: HOSPITAL | Age: 33
Discharge: HOME | End: 2023-01-16
Attending: EMERGENCY MEDICINE

## 2023-01-16 VITALS
HEART RATE: 81 BPM | WEIGHT: 209 LBS | OXYGEN SATURATION: 99 % | TEMPERATURE: 97.6 F | SYSTOLIC BLOOD PRESSURE: 150 MMHG | RESPIRATION RATE: 15 BRPM | DIASTOLIC BLOOD PRESSURE: 89 MMHG | BODY MASS INDEX: 27.57 KG/M2

## 2023-01-16 DIAGNOSIS — K02.9 DENTAL CARIES: ICD-10-CM

## 2023-01-16 DIAGNOSIS — K08.89 PAIN, DENTAL: Primary | ICD-10-CM

## 2023-01-16 RX ORDER — LIDOCAINE HYDROCHLORIDE 20 MG/ML
15 SOLUTION OROPHARYNGEAL ONCE
Status: COMPLETED | OUTPATIENT
Start: 2023-01-16 | End: 2023-01-16

## 2023-01-16 RX ORDER — KETOROLAC TROMETHAMINE 30 MG/ML
15 INJECTION, SOLUTION INTRAMUSCULAR; INTRAVENOUS ONCE
Status: COMPLETED | OUTPATIENT
Start: 2023-01-16 | End: 2023-01-16

## 2023-01-16 RX ORDER — OXYCODONE HYDROCHLORIDE 5 MG/1
5 TABLET ORAL ONCE
Status: COMPLETED | OUTPATIENT
Start: 2023-01-16 | End: 2023-01-16

## 2023-01-16 RX ORDER — NAPROXEN 500 MG/1
500 TABLET ORAL 2 TIMES DAILY WITH MEALS
Qty: 15 TABLET | Refills: 0 | Status: SHIPPED | OUTPATIENT
Start: 2023-01-16

## 2023-01-16 RX ORDER — OXYCODONE HYDROCHLORIDE 5 MG/1
2.5 TABLET ORAL ONCE
Status: DISCONTINUED | OUTPATIENT
Start: 2023-01-16 | End: 2023-01-16

## 2023-01-16 RX ADMIN — KETOROLAC TROMETHAMINE 15 MG: 30 INJECTION, SOLUTION INTRAMUSCULAR at 02:04

## 2023-01-16 RX ADMIN — LIDOCAINE HYDROCHLORIDE 15 ML: 20 SOLUTION ORAL; TOPICAL at 02:04

## 2023-01-16 RX ADMIN — OXYCODONE HYDROCHLORIDE 5 MG: 5 TABLET ORAL at 02:04

## 2023-01-16 NOTE — ED PROVIDER NOTES
History  Chief Complaint   Patient presents with   • Dental Pain     Dental pain to right lower molars - treated multiple times in ED for same complaint     HPI     32 yo M hx of hiv presents to ed for dental pain  Pain began: ongoing for weeks, known dental carries  Bottom right  Supposed to get tooth pulled Feb 7  Goes to [facility]  Already on flagyl, prescribed by dentist per patient  Denies fevers  Meds taken for pain:  Motrin 4 hours ago  Localized non radiating  8/10  No tongue swelling  No difficulty swallowing  No trismus  No other complaints on ROS  Prior to Admission Medications   Prescriptions Last Dose Informant Patient Reported? Taking?    Biktarvy -25 MG tablet   Yes No   Sig: Take 1 tablet by mouth daily   EPINEPHrine (EPIPEN) 0 3 mg/0 3 mL SOAJ   No No   Sig: Inject 0 3 mL (0 3 mg total) into a muscle once for 1 dose   albuterol (PROVENTIL HFA,VENTOLIN HFA) 90 mcg/act inhaler   No No   Sig: Inhale 2 puffs every 4 (four) hours as needed for wheezing   benzocaine (ORAJEL) 10 % mucosal gel   No No   Sig: Apply 1 application to the mouth or throat as needed for mucositis   benzocaine (ORAJEL) 10 % mucosal gel   No No   Sig: Apply 1 application to the mouth or throat 3 (three) times a day as needed for mucositis   capsaicin (ZOSTRIX) 0 025 % cream   No No   Sig: Apply 250 application topically 3 (three) times a day   chlorthalidone 25 mg tablet   No No   Sig: Take 1 tablet (25 mg total) by mouth daily   fluticasone (Flovent HFA) 44 mcg/act inhaler   No No   Sig: Inhale 2 puffs 2 (two) times a day Rinse mouth after use    hydrOXYzine HCL (ATARAX) 25 mg tablet   No No   Sig: Take 1 tablet (25 mg total) by mouth every 6 (six) hours   ibuprofen (MOTRIN) 400 mg tablet   Yes No   Sig: Take by mouth every 6 (six) hours as needed for mild pain   ibuprofen (MOTRIN) 600 mg tablet   No No   Sig: Take 1 tablet (600 mg total) by mouth every 6 (six) hours as needed for mild pain   ibuprofen (MOTRIN) 600 mg tablet   No No   Sig: Take 1 tablet (600 mg total) by mouth every 6 (six) hours as needed for mild pain or moderate pain   ketorolac (TORADOL) 10 mg tablet   No No   Sig: Take 1 tablet (10 mg total) by mouth every 6 (six) hours as needed for moderate pain or severe pain   ketorolac (TORADOL) 10 mg tablet   No No   Sig: Take 1 tablet (10 mg total) by mouth every 6 (six) hours as needed for moderate pain   metroNIDAZOLE (FLAGYL) 500 mg tablet   No No   Sig: Take 1 tablet (500 mg total) by mouth every 8 (eight) hours for 7 days   oxyCODONE-acetaminophen (PERCOCET) 5-325 mg per tablet   Yes No   Sig: Take 1 tablet by mouth every 4 (four) hours as needed for moderate pain   oxyCODONE-acetaminophen (PERCOCET) 5-325 mg per tablet   No No   Sig: Take 1 tablet by mouth every 6 (six) hours as needed for severe pain for up to 10 dosesMax Daily Amount: 4 tablets   predniSONE 20 mg tablet   No No   Sig: Take 2 tablets (40 mg total) by mouth daily for 5 days      Facility-Administered Medications: None       Past Medical History:   Diagnosis Date   • Asthma    • HIV (human immunodeficiency virus infection) ([facility] [address] )    • Hypertension        Past Surgical History:   Procedure Laterality Date   • PATELLA FRACTURE SURGERY Right        History reviewed  No pertinent family history  I have reviewed and agree with the history as documented  E-Cigarette/Vaping   • E-Cigarette Use Never User      E-Cigarette/Vaping Substances     Social History     Tobacco Use   • Smoking status: Every Day     Packs/day: 0 25     Types: Cigarettes   • Smokeless tobacco: Never   Vaping Use   • Vaping Use: Never used   Substance Use Topics   • Alcohol use: Not Currently   • Drug use: Yes     Types: Marijuana     Comment: daily       Review of Systems   Constitutional: Negative for chills, fatigue and fever  HENT: Positive for dental problem  Negative for nosebleeds and sore throat  Eyes: Negative for redness and visual disturbance  Respiratory: Negative for shortness of breath and wheezing  Cardiovascular: Negative for chest pain and palpitations  Gastrointestinal: Negative for abdominal pain and diarrhea  Endocrine: Negative for polyuria  Genitourinary: Negative for difficulty urinating and testicular pain  Musculoskeletal: Negative for back pain and neck stiffness  Skin: Negative for rash and wound  Neurological: Negative for seizures, speech difficulty and headaches  Psychiatric/Behavioral: Negative for dysphoric mood and hallucinations  All other systems reviewed and are negative  Physical Exam  Physical Exam  Vitals and nursing note reviewed  Constitutional:       Appearance: He is well-developed  HENT:      Head: Normocephalic and atraumatic  Right Ear: External ear normal       Left Ear: External ear normal       Mouth/Throat:     Eyes:      Conjunctiva/sclera: Conjunctivae normal    Cardiovascular:      Rate and Rhythm: Normal rate and regular rhythm  Heart sounds: Normal heart sounds  Pulmonary:      Effort: Pulmonary effort is normal       Breath sounds: Normal breath sounds  No wheezing  Chest:      Chest wall: No tenderness  Abdominal:      General: Bowel sounds are normal       Palpations: Abdomen is soft  Tenderness: There is no abdominal tenderness  There is no guarding  Musculoskeletal:         General: Normal range of motion  Cervical back: Normal range of motion  Skin:     General: Skin is warm and dry  Findings: No rash  Neurological:      Mental Status: He is alert and oriented to person, place, and time  Cranial Nerves: No cranial nerve deficit  Sensory: No sensory deficit  Motor: No abnormal muscle tone        Coordination: Coordination normal          Vital Signs  ED Triage Vitals   Temperature Pulse Respirations Blood Pressure SpO2   01/16/23 0145 01/16/23 0145 01/16/23 0145 01/16/23 0145 01/16/23 0145   97 6 °F (36 4 °C) 81 15 150/89 99 % Temp src Heart Rate Source Patient Position - Orthostatic VS BP Location FiO2 (%)   -- -- 01/16/23 0145 01/16/23 0145 --     Sitting Left arm       Pain Score       01/16/23 0204       10 - Worst Possible Pain           Vitals:    01/16/23 0145   BP: 150/89   Pulse: 81   Patient Position - Orthostatic VS: Sitting         Visual Acuity      ED Medications  Medications   ketorolac (TORADOL) injection 15 mg (15 mg Intramuscular Given 1/16/23 0204)   Lidocaine Viscous HCl (XYLOCAINE) 2 % mucosal solution 15 mL (15 mL Swish & Spit Given 1/16/23 0204)   oxyCODONE (ROXICODONE) IR tablet 5 mg (5 mg Oral Given 1/16/23 0204)       Diagnostic Studies  Results Reviewed     None                 No orders to display              Procedures  Procedures         ED Course             MDM    History Provided by patient    Differential considered dental carries vs periapical abscess    Consideration of tests (if no tests ordered, why: patient has routine dental pain with no red flags on history or exam to suggest deeper space infection)    Provided analgesia and referred to dental clinic  The patient was instructed to follow up as documented  Strict return precautions were discussed with the patient and the patient was instructed to return to the emergency department immediately if symptoms worsen  The patient/patient family member acknowledged and were in agreement with plan  Disposition  Final diagnoses:   Pain, dental   Dental caries     Time reflects when diagnosis was documented in both MDM as applicable and the Disposition within this note     Time User Action Codes Description Comment    1/16/2023  2:05 AM Evan Denver Add [K08 89] Pain, dental     1/16/2023  2:05 AM Evan Denver Add [K02 9] Dental caries       ED Disposition     ED Disposition   Discharge    Condition   Stable    Date/Time   Mon Jan 16, 2023  2:06 AM    [facility] discharge to home/self care                 Follow-up Information Follow up With Specialties Details Why [address]    [address]  [address]  Schedule an appointment as soon as possible for a visit today For follow up regarding your symptoms and recheck [address]  [address]  [phone]          Discharge Medication List as of 1/16/2023  2:06 AM      START taking these medications    Details   naproxen (NAPROSYN) 500 mg tablet Take 1 tablet (500 mg total) by mouth 2 (two) times a day with meals, Starting Mon 1/16/2023, Normal         CONTINUE these medications which have NOT CHANGED    Details   albuterol (PROVENTIL HFA,VENTOLIN HFA) 90 mcg/act inhaler Inhale 2 puffs every 4 (four) hours as needed for wheezing, Starting Tue 12/27/2022, Normal      !! benzocaine (ORAJEL) 10 % mucosal gel Apply 1 application to the mouth or throat as needed for mucositis, Starting Mon 1/9/2023, Normal      !! benzocaine (ORAJEL) 10 % mucosal gel Apply 1 application to the mouth or throat 3 (three) times a day as needed for mucositis, Starting Wed 1/11/2023, Normal      Biktarvy -25 MG tablet Take 1 tablet by mouth daily, Starting Fri 12/2/2022, Historical Med      capsaicin (ZOSTRIX) 0 025 % cream Apply 119 application topically 3 (three) times a day, Starting Tue 12/27/2022, Normal      chlorthalidone 25 mg tablet Take 1 tablet (25 mg total) by mouth daily, Starting Fri 12/30/2022, Normal      EPINEPHrine (EPIPEN) 0 3 mg/0 3 mL SOAJ Inject 0 3 mL (0 3 mg total) into a muscle once for 1 dose, Starting Wed 1/11/2023, Normal      fluticasone (Flovent HFA) 44 mcg/act inhaler Inhale 2 puffs 2 (two) times a day Rinse mouth after use , Starting Tue 12/27/2022, Normal      hydrOXYzine HCL (ATARAX) 25 mg tablet Take 1 tablet (25 mg total) by mouth every 6 (six) hours, Starting Sun 10/30/2022, Normal      !! ibuprofen (MOTRIN) 400 mg tablet Take by mouth every 6 (six) hours as needed for mild pain, Historical Med      !! ibuprofen (MOTRIN) 600 mg tablet Take 1 tablet (600 mg total) by mouth every 6 (six) hours as needed for mild pain, Starting Wed 11/30/2022, Print      !! ibuprofen (MOTRIN) 600 mg tablet Take 1 tablet (600 mg total) by mouth every 6 (six) hours as needed for mild pain or moderate pain, Starting Mon 1/9/2023, Normal      !! ketorolac (TORADOL) 10 mg tablet Take 1 tablet (10 mg total) by mouth every 6 (six) hours as needed for moderate pain or severe pain, Starting Sun 5/2/2021, Normal      !! ketorolac (TORADOL) 10 mg tablet Take 1 tablet (10 mg total) by mouth every 6 (six) hours as needed for moderate pain, Starting Wed 1/11/2023, Normal      metroNIDAZOLE (FLAGYL) 500 mg tablet Take 1 tablet (500 mg total) by mouth every 8 (eight) hours for 7 days, Starting Fri 1/13/2023, Until Fri 1/20/2023, Print      !! oxyCODONE-acetaminophen (PERCOCET) 5-325 mg per tablet Take 1 tablet by mouth every 4 (four) hours as needed for moderate pain, Historical Med      !! oxyCODONE-acetaminophen (PERCOCET) 5-325 mg per tablet Take 1 tablet by mouth every 6 (six) hours as needed for severe pain for up to 10 dosesMax Daily Amount: 4 tablets, Starting Sun 5/2/2021, Normal      predniSONE 20 mg tablet Take 2 tablets (40 mg total) by mouth daily for 5 days, Starting Wed 1/11/2023, Until Mon 1/16/2023, Normal       !! - Potential duplicate medications found  Please discuss with provider  No discharge procedures on file      PDMP Review     None          ED Provider  Electronically Signed by           Ras Ramos MD  01/17/23 1192

## 2023-01-16 NOTE — PROGRESS NOTES
Pt presents for limited exam  ASA II- PMH of HIV, asthma, HTN   Pt reports 10/10 pain on tooth 31    Pt had OS appointment in [place] which he missed due to his bus being late     Tooth is grossly cavitate with pulp involvement  A lot of the tooth structure is broken and tooth is not restorable with RCT/ crown     No swelling   Pt afebrile   E/O and I/O soft tissue and lymph nodes WNL      PT wants tooth taken out today  Explained that that is not possible since this is a limited exam and there is not sufficient time to safely extract the tooth  Furthermore, explained to the patient that we do not offer sedation at the clinic  Pt stated that he just wants the tooth extracted ASAP and can tolerate the procedure without anaesthesia     Pt requested medication for pain  OTC tylenol and ibuprofen recommended and advised not to go over max limit, however pt insists that these meds are not helping him and wants something stronger    Offered to renew pt antibiotic prescription, pt wanted some "stronger" pain meds, but advised to take his abx course as it will help aleviate some pain    Pt was recently in the ED with a suspected allergic reaction to Augmentin or pomegranate, so Flagyl was prescribed to him today      Advised pt to go to the ED if he experiences facial swelling, swelling under his tongue, fever, shortness of breath or any systemic symptoms     Pt insists on extracting tooth asap at our clinic   Offered to re-do his referral to OS as well as attempt to be scheduled at our clinic and go with whichever appointment is sooner     STAT OS referral given again     PT dismissed ambulatory

## 2023-01-17 ENCOUNTER — OFFICE VISIT (OUTPATIENT)
Dept: DENTISTRY | Facility: CLINIC | Age: 33
End: 2023-01-17

## 2023-01-17 VITALS — TEMPERATURE: 99.8 F | DIASTOLIC BLOOD PRESSURE: 91 MMHG | SYSTOLIC BLOOD PRESSURE: 147 MMHG | HEART RATE: 84 BPM

## 2023-01-17 DIAGNOSIS — K08.89 TOOTH PAIN: Primary | ICD-10-CM

## 2023-01-17 DIAGNOSIS — K04.02 IRREVERSIBLE PULPITIS: ICD-10-CM

## 2023-01-17 DIAGNOSIS — K04.7 DENTAL ABSCESS: ICD-10-CM

## 2023-01-17 RX ORDER — CLINDAMYCIN HYDROCHLORIDE 150 MG/1
150 CAPSULE ORAL EVERY 6 HOURS SCHEDULED
Qty: 28 CAPSULE | Refills: 0 | Status: SHIPPED | OUTPATIENT
Start: 2023-01-17 | End: 2023-01-24

## 2023-01-17 NOTE — DENTAL PROCEDURE DETAILS
Surgical Extraction Encounter    Corey Gilbert is a 34 yo Afro-Am male that  presented to clinic for Ext #29, [address], patient denies any changes  Obtained a direct and personal consent  Risks and complications were explained  Pt agreed and consented  Consent scanned in doc center  Pre-Op BP: 147/91mmHg  ASA II        Administered 2 carpules of 2 % Lidocaine w/ 1:100,000 epi via AUDREY block and 1 carpules of 4% Articaine w/ 1:100,000 epi via AUDREY block  Adequate anesthesia NOT obtained  Pt reported feeling like teeth hurt more after anesthesia injections  Pt also reported only the tip of tongue being numb  This may have been d/t high pain anticipation anxiety and inflammation of infected teeth  Pt had limited mouth opening and showed discomfort with the mirror and explorer being placed in mouth  RX: Clindamycin 150mg (Pt has allergy to Amox and red dye  Note to pharmacy to give pills without red dye)    Referral: OMFS referral re-printed along with Pan taken today    Because patient could not get numb, pt was sent downstairs to referral specialists Shiloh Queen and Ham Taylor) to schedule new OMFS appt and transportation assistance  Pt reports he had an appt this month for OMFS ext, however was late d/t the bus being late  POI given  Patient was dismissed stable and ambulatory      NV: Comp exam AFTER OS Ext

## 2023-01-17 NOTE — PROGRESS NOTES
Surgical Extraction Encounter    Corey Gilbert is a 32 yo Afro-Am male that  presented to clinic for Ext #29, 31  [address], patient denies any changes  Significant Findings:  · RX: Biktarvy  · Allergies: Amoxicilin, Red Dye  · Asthma    Obtained a direct and personal consent  Risks and complications were explained  Pt agreed and consented  Consent scanned in doc center  Pre-Op BP: 147/91mmHg  ASA II  Pain = 10/10    Administered 2 carpules of 2 % Lidocaine w/ 1:100,000 epi via AUDREY block and 1 carpules of 4% Articaine w/ 1:100,000 epi via AUDREY block  Adequate anesthesia NOT obtained  Pt reported feeling like teeth hurt more after anesthesia injections  Pt also reported only the tip of tongue being numb  This may have been d/t high pain anticipation anxiety and inflammation of infected teeth  Pt had limited mouth opening and showed discomfort with the mirror and explorer being placed in mouth  RX: Clindamycin 150mg (Pt has allergy to Amox and red dye  Note to pharmacy to give pills without red dye)    Referral: OMFS referral re-printed along with Pan taken today    Because patient could not get numb, pt was sent downstairs to referral specialists Aaron Mcgarry and Patricia Ramos) to schedule new OMFS appt and transportation assistance  Pt reports he had an appt this month for OMFS ext, however was late d/t the bus being late  POI given  Patient was dismissed stable and ambulatory      Done Today: Frank, [id number], OS referral    NV: Comp exam AFTER OS Ext

## 2023-01-18 ENCOUNTER — OFFICE VISIT (OUTPATIENT)
Dept: UROLOGY | Facility: AMBULATORY SURGERY CENTER | Age: 33
End: 2023-01-18

## 2023-01-18 VITALS
BODY MASS INDEX: 31.1 KG/M2 | WEIGHT: 210 LBS | SYSTOLIC BLOOD PRESSURE: 138 MMHG | HEIGHT: 69 IN | DIASTOLIC BLOOD PRESSURE: 84 MMHG | OXYGEN SATURATION: 95 % | HEART RATE: 80 BPM

## 2023-01-18 DIAGNOSIS — K64.8 HEMORRHOID PROLAPSE: ICD-10-CM

## 2023-01-18 DIAGNOSIS — A63.0 PENILE WART: Primary | ICD-10-CM

## 2023-01-18 NOTE — PROGRESS NOTES
1/18/2023      Chief Complaint   Patient presents with   • Genital Warts       Assessment and Plan    33 y o  male managed by NEW PATIENT  1  Genital Wart   · Penile exam- moderate pea sized genital wart  · OR case requested and signed    2  Hemorrhoid (prolaspe)  · Referral to Colorectal surgery provided    3  HIV  · Patient reports undetectable viral load  Unable to obtain labs confirming    History of Present Illness  Corey Caldera is a 33 y o  male here for follow up evaluation of  Genital wart  Patient reports a bump on the right lateral side of penis which has been persistent over the course of the last few months  Over the last few weeks he has noticed an increase in its size  He denies penile discharge and drainage  He denies pain upon palpation  He reports being sexually active with male partners  He participates in oral sex  He denies rectal sex acts  He also has complaints of irritation and a bump noted to the rectum  Patient reports smoking 3 to 5 cigarettes/day, "a lot of marijuana", he denies use of alcoholic beverages  He reports a history of gonorrhea 4/13/2022 with treatment by the [facility]  Does report being HIV positive  Other past medical history includes asthma and hypertension  Review of Systems   Constitutional: Negative for chills and fever  Respiratory: Negative for cough and shortness of breath  Cardiovascular: Negative for chest pain  Gastrointestinal: Positive for anal bleeding  Negative for abdominal distention, abdominal pain, blood in stool, nausea and vomiting  Genitourinary: Positive for genital sores  Negative for difficulty urinating, dysuria, enuresis, flank pain, frequency, hematuria and urgency  Skin: Negative for rash         Past Medical History  Past Medical History:   Diagnosis Date   • Asthma    • HIV (human immunodeficiency virus infection) ([facility] [address] )    • Hypertension        Past Social History  Past Surgical History: Procedure Laterality Date   • PATELLA FRACTURE SURGERY Right      Social History     Tobacco Use   Smoking Status Every Day   • Packs/day: 0 25   • Types: Cigarettes   Smokeless Tobacco Never       Past Family History  History reviewed  No pertinent family history      Past Social history  Social History     Socioeconomic History   • Marital status: Single     Spouse name: Not on file   • Number of children: Not on file   • Years of education: Not on file   • Highest education level: Not on file   Occupational History   • Not on file   Tobacco Use   • Smoking status: Every Day     Packs/day: 0 25     Types: Cigarettes   • Smokeless tobacco: Never   Vaping Use   • Vaping Use: Never used   Substance and Sexual Activity   • Alcohol use: Not Currently   • Drug use: Yes     Types: Marijuana     Comment: daily   • Sexual activity: Not on file   Other Topics Concern   • Not on file   Social History Narrative   • Not on file     Social Determinants of Health     Financial Resource Strain: Not on file   Food Insecurity: Not on file   Transportation Needs: Not on file   Physical Activity: Not on file   Stress: Not on file   Social Connections: Not on file   Intimate Partner Violence: Not on file   Housing Stability: Not on file       Current Medications  Current Outpatient Medications   Medication Sig Dispense Refill   • Biktarvy -25 MG tablet Take 1 tablet by mouth daily     • capsaicin (ZOSTRIX) 0 025 % cream Apply 955 application topically 3 (three) times a day 60 g 0   • chlorthalidone 25 mg tablet Take 1 tablet (25 mg total) by mouth daily 90 tablet 0   • clindamycin (CLEOCIN) 150 mg capsule Take 1 capsule (150 mg total) by mouth every 6 (six) hours for 7 days 28 capsule 0   • ketorolac (TORADOL) 10 mg tablet Take 1 tablet (10 mg total) by mouth every 6 (six) hours as needed for moderate pain 20 tablet 0   • albuterol (PROVENTIL HFA,VENTOLIN HFA) 90 mcg/act inhaler Inhale 2 puffs every 4 (four) hours as needed for wheezing (Patient not taking: Reported on 1/18/2023) 18 g 3   • benzocaine (ORAJEL) 10 % mucosal gel Apply 1 application to the mouth or throat as needed for mucositis (Patient not taking: Reported on 1/18/2023) 5 3 g 0   • benzocaine (ORAJEL) 10 % mucosal gel Apply 1 application to the mouth or throat 3 (three) times a day as needed for mucositis (Patient not taking: Reported on 1/18/2023) 5 3 g 0   • EPINEPHrine (EPIPEN) 0 3 mg/0 3 mL SOAJ Inject 0 3 mL (0 3 mg total) into a muscle once for 1 dose (Patient not taking: Reported on 1/18/2023) 0 6 mL 0   • fluticasone (Flovent HFA) 44 mcg/act inhaler Inhale 2 puffs 2 (two) times a day Rinse mouth after use   (Patient not taking: Reported on 1/18/2023) 10 6 g 3   • hydrOXYzine HCL (ATARAX) 25 mg tablet Take 1 tablet (25 mg total) by mouth every 6 (six) hours (Patient not taking: Reported on 1/18/2023) 12 tablet 0   • ibuprofen (MOTRIN) 400 mg tablet Take by mouth every 6 (six) hours as needed for mild pain (Patient not taking: Reported on 1/18/2023)     • ibuprofen (MOTRIN) 600 mg tablet Take 1 tablet (600 mg total) by mouth every 6 (six) hours as needed for mild pain (Patient not taking: Reported on 1/18/2023) 30 tablet 0   • ibuprofen (MOTRIN) 600 mg tablet Take 1 tablet (600 mg total) by mouth every 6 (six) hours as needed for mild pain or moderate pain (Patient not taking: Reported on 1/18/2023) 20 tablet 0   • ketorolac (TORADOL) 10 mg tablet Take 1 tablet (10 mg total) by mouth every 6 (six) hours as needed for moderate pain or severe pain (Patient not taking: Reported on 1/18/2023) 20 tablet 0   • naproxen (NAPROSYN) 500 mg tablet Take 1 tablet (500 mg total) by mouth 2 (two) times a day with meals (Patient not taking: Reported on 1/18/2023) 15 tablet 0   • oxyCODONE-acetaminophen (PERCOCET) 5-325 mg per tablet Take 1 tablet by mouth every 4 (four) hours as needed for moderate pain (Patient not taking: Reported on 1/18/2023)     • oxyCODONE-acetaminophen (PERCOCET) 5-325 mg per tablet Take 1 tablet by mouth every 6 (six) hours as needed for severe pain for up to 10 dosesMax Daily Amount: 4 tablets (Patient not taking: Reported on 1/18/2023) 10 tablet 0     No current facility-administered medications for this visit  Allergies  Allergies   Allergen Reactions   • Other Anaphylaxis     ALL SEAFOOD   • Red Dye - Food Allergy Shortness Of Breath   • Amoxicillin Swelling         The following portions of the patient's history were reviewed and updated as appropriate: allergies, current medications, past medical history, past social history, past surgical history and problem list       Vitals  Vitals:    01/18/23 0917   BP: 138/84   BP Location: Left arm   Patient Position: Sitting   Cuff Size: Adult   Pulse: 80   SpO2: 95%   Weight: 95 3 kg (210 lb)   Height: 5' 9" (1 753 m)           Physical Exam  Physical Exam  Vitals reviewed  Constitutional:       General: He is not in acute distress  Appearance: Normal appearance  He is normal weight  HENT:      Head: Normocephalic  Cardiovascular:      Rate and Rhythm: Normal rate and regular rhythm  Heart sounds: Normal heart sounds  Pulmonary:      Effort: No respiratory distress  Breath sounds: Normal breath sounds  Abdominal:      Palpations: Abdomen is soft  Tenderness: There is no abdominal tenderness  Genitourinary:     Rectum: External hemorrhoid present  Comments: Penis with pea sized wart noted to right lateral penis  Testicle unremarkable  Skin:     General: Skin is warm and dry  Neurological:      General: No focal deficit present  Mental Status: He is alert and oriented to person, place, and time  Psychiatric:         Mood and Affect: Mood normal          Behavior: Behavior normal        Results  No results found for this or any previous visit (from the past 1 hour(s))  ]  No results found for: PSA  Lab Results   Component Value Date    CALCIUM 8 9 05/02/2021    K 3 8 05/02/2021    CO2 25 05/02/2021     05/02/2021    BUN 9 05/02/2021    CREATININE 0 95 05/02/2021     Lab Results   Component Value Date    WBC 11 36 (H) 05/02/2021    HGB 15 7 05/02/2021    HCT 46 1 05/02/2021    MCV 90 05/02/2021     05/02/2021       Orders  Orders Placed This Encounter   Procedures   • Ambulatory Referral to Colorectal Surgery     Standing Status:   Future     Standing Expiration Date:   1/18/2024     Referral Priority:   Routine     Referral Type:   Consult - AMB     Referral Reason:   Specialty Services Required     Requested Specialty:   Colon and Rectal Surgery     Number of Visits Requested:   1     Expiration Date:   1/18/2024       GABBY Muñiz

## 2023-01-19 ENCOUNTER — TELEPHONE (OUTPATIENT)
Dept: UROLOGY | Facility: MEDICAL CENTER | Age: 33
End: 2023-01-19

## 2023-01-26 ENCOUNTER — TELEPHONE (OUTPATIENT)
Dept: UROLOGY | Facility: MEDICAL CENTER | Age: 33
End: 2023-01-26

## 2023-01-30 ENCOUNTER — LAB REQUISITION (OUTPATIENT)
Dept: LAB | Facility: HOSPITAL | Age: 33
End: 2023-01-30

## 2023-01-30 DIAGNOSIS — A63.0 ANOGENITAL (VENEREAL) WARTS: ICD-10-CM

## 2023-01-30 NOTE — TELEPHONE ENCOUNTER
Spoke with patient and he is scheduled 2/22 with Dr Molina Argueta @ [facility]  Patient does need medical clearance due to his asthma - he does have an appt tomorrow 1/31 - tried to get into contact with office (Deann Jeffers) - had to leave a voicemail  Faxed form over    Patient is aware of prep, NPO, ride to and from, no blood thinning meds 7 days prior, no testing required       Mailed out surgery packet 1/30

## 2023-01-31 ENCOUNTER — TELEPHONE (OUTPATIENT)
Dept: FAMILY MEDICINE CLINIC | Facility: CLINIC | Age: 33
End: 2023-01-31

## 2023-01-31 NOTE — TELEPHONE ENCOUNTER
Received your VM from yesterday  Pt appt for today changed to pre op clearance  We use the pre op template in Epic so the form is usually not necessary

## 2023-01-31 NOTE — TELEPHONE ENCOUNTER
=  Jose Alfredo Boyce RN routed conversation to You 3 hours ago (7:35 AM)     Jose Alfredo Boyce RN 3 hours ago (7:35 AM)     TK  Received your VM from yesterday  Pt appt for today changed to pre op clearance  We use the pre op template in Epic so the form is usually not necessary           Note

## 2023-02-01 ENCOUNTER — TELEPHONE (OUTPATIENT)
Dept: UROLOGY | Facility: MEDICAL CENTER | Age: 33
End: 2023-02-01

## 2023-02-10 NOTE — TELEPHONE ENCOUNTER
Laureen Schmitz RN sent to [employer] like he was a no show to his [facility] appt 1/31  Called patient and he did no show his medical clearance appointment  Patient said he did not show up because he just got hemorrhoid surgery on 1/26 and could not walk  Patient stated he is also having a tooth pulled on 2/14 and will "be under" at that appointment and is not sure why he "needs medical clearance since he has been under twice"  His surgery on 1/26 was with Maricarmen Ocampo MD and there are surgical path reports in his Care Everywhere that he was cleared for that

## 2023-02-10 NOTE — PRE-PROCEDURE INSTRUCTIONS
Pre-Surgery Instructions:   Medication Instructions   • albuterol (PROVENTIL HFA,VENTOLIN HFA) 90 mcg/act inhaler Uses PRN- OK to take day of surgery   • benzocaine (ORAJEL) 10 % mucosal gel Uses PRN- DO NOT take day of surgery   • Biktarvy -25 MG tablet Take day of surgery  • capsaicin (ZOSTRIX) 0 025 % cream Hold day of surgery  • chlorthalidone 25 mg tablet Hold day of surgery  • EPINEPHrine (EPIPEN) 0 3 mg/0 3 mL SOAJ Uses PRN- OK to take day of surgery   • fluticasone (Flovent HFA) 44 mcg/act inhaler Take day of surgery  • ibuprofen (MOTRIN) 600 mg tablet Stop taking 7 days prior to surgery  • ketorolac (TORADOL) 10 mg tablet Stop taking 7 days prior to surgery  • naproxen (NAPROSYN) 500 mg tablet Stop taking 7 days prior to surgery  • oxyCODONE-acetaminophen (PERCOCET) 5-325 mg per tablet Uses PRN- OK to take day of surgery     Spoke with pt via phone  Advised hospital location will call with arrival time night before surgery  NPO after midnight the night before surgery, including chewing gum and hard candy  A small sip of water is allowed to take approved medications the morning of surgery  Non-vaccinated patients and visitors need to remain masked at all times while in the hospital     Instructed to leave contacts/jewelery/valuables at home  Ok to wear dentures, glasses and hearing aides into the hospital - they will be removed for surgery  No smoking or alcohol consumption 24 hours prior to surgery  Avoid all Aspirin products and OTC Vit/Supp 1 week prior to surgery and avoid NSAIDs 3 days prior to surgery per anesthesia instructions  Tylenol ok to take prn  Showering instructions reviewed for night before and morning of surgery using surgical soap or antibacterial soap  Patient verbalized understanding of all of the above, including medication instructions

## 2023-02-10 NOTE — TELEPHONE ENCOUNTER
Patient is calling in to notify the office that he did get medical clearance   Please call him back to discuss

## 2023-02-10 NOTE — TELEPHONE ENCOUNTER
Allyson Plaza RN sent to [facility]  I see the path reports from 1/26 and a PCP note from 12/27, which does not give specific clearance, but does mention the need for this surgery  As long as Dr Kina De León is ok proceeding with the current documentation since he is the one who requested [facility], then good to go  Sent over to Dr Kina De León, waiting for him to confirm

## 2023-02-13 DIAGNOSIS — T65.291A TOXIC EFFECT OF TOBACCO AND NICOTINE, ACCIDENTAL OR UNINTENTIONAL, INITIAL ENCOUNTER: Primary | ICD-10-CM

## 2023-02-15 ENCOUNTER — CONSULT (OUTPATIENT)
Dept: FAMILY MEDICINE CLINIC | Facility: CLINIC | Age: 33
End: 2023-02-15

## 2023-02-15 VITALS
TEMPERATURE: 97.8 F | DIASTOLIC BLOOD PRESSURE: 84 MMHG | BODY MASS INDEX: 30.81 KG/M2 | HEIGHT: 69 IN | OXYGEN SATURATION: 98 % | RESPIRATION RATE: 18 BRPM | SYSTOLIC BLOOD PRESSURE: 136 MMHG | HEART RATE: 84 BPM | WEIGHT: 208 LBS

## 2023-02-15 DIAGNOSIS — R03.0 ELEVATED BP WITHOUT DIAGNOSIS OF HYPERTENSION: ICD-10-CM

## 2023-02-15 DIAGNOSIS — Z01.818 PRE-OP EXAMINATION: Primary | ICD-10-CM

## 2023-02-15 DIAGNOSIS — Z72.0 TOBACCO USE: ICD-10-CM

## 2023-02-15 DIAGNOSIS — Z21 ASYMPTOMATIC HIV INFECTION, WITH NO HISTORY OF HIV-RELATED ILLNESS (HCC): ICD-10-CM

## 2023-02-15 DIAGNOSIS — J45.40 MODERATE PERSISTENT ASTHMA WITHOUT COMPLICATION: ICD-10-CM

## 2023-02-15 PROBLEM — B34.9 VIRAL SYNDROME: Status: RESOLVED | Noted: 2022-12-27 | Resolved: 2023-02-15

## 2023-02-15 PROBLEM — S82.141A CLOSED DISPLACED BICONDYLAR FRACTURE OF RIGHT TIBIA: Status: RESOLVED | Noted: 2021-05-06 | Resolved: 2023-02-15

## 2023-02-15 PROBLEM — Z20.822 ENCOUNTER FOR LABORATORY TESTING FOR COVID-19 VIRUS: Status: RESOLVED | Noted: 2022-12-27 | Resolved: 2023-02-15

## 2023-02-15 PROBLEM — S82.141A CLOSED DISPLACED BICONDYLAR FRACTURE OF RIGHT TIBIA: Status: ACTIVE | Noted: 2021-05-06

## 2023-02-15 NOTE — ASSESSMENT & PLAN NOTE
Uncontrolled  Pt confused about prescribed regimen  Reports daily symptoms but did not refill albuterol, using Flovent PRN  - Education provided regarding medication regimen and expected outcomes  - Start using Flovent 2 puffs BID every day  - Obtain albuterol refill from pharmacy for PRN use   - Stop smoking

## 2023-02-15 NOTE — ASSESSMENT & PLAN NOTE
Pt self-discontinued chlorthalidone about 3 weeks ago due to side effects  /84 in office today  - Follow up with PCP after surgery to discuss need for further medication   - Stop smoking    - Low salt diet and daily physical activity

## 2023-02-15 NOTE — ASSESSMENT & PLAN NOTE
Pt reports initial diagnosis June 2022, states viral load has been undetectable since July 2022 with Biktarvy therapy  Denies any symptoms  Recommended obtaining updated CD4 count and viral load  Pt states this was done at [place] [id number] in December   - Pt to contact [facility] today to have blood work results faxed to this office  Pt aware if not able to obtain, to go to lab to have blood work drawn

## 2023-02-15 NOTE — ASSESSMENT & PLAN NOTE
- Strongly advised smoking cessation for the benefit of overall health as well as effect on asthma control

## 2023-02-15 NOTE — PROGRESS NOTES
FAMILY PRACTICE PRE-OPERATIVE EVALUATION  [facility] PHYSICIAN [facility] PRACTICE HOMER     NAME: Corey Gilbert  AGE: 33 y o  SEX: male  : 1990     DATE: 2/15/2023     [facility] Practice Pre-Operative Evaluation      Chief Complaint: Pre-operative Evaluation     Surgery: EXCISION CONDYLOMA PERINEAL (PENILE/VAGINAL) WITH LASER CO2 (Perineum)  Anticipated Date of Surgery: 2023  Referring Provider: Olman White MD     History of Present Illness:      Corey Gilbert is a 33 y o  male who presents to the office today for a preoperative consultation at the request of surgeon, Olman White MD  Planned anesthesia is general  Patient has a bleeding risk of: no recent abnormal bleeding, no remote history of abnormal bleeding and no use of Ca-channel blockers  Patient does not have objections to receiving blood products if needed  Current anti-platelet/anti-coagulation medications that the patient is prescribed includes: none  Assessment of Cardiac Risk:  · Denies unstable or severe angina or MI in the last 6 weeks or history of stent placement in the last year   · Denies decompensated heart failure (e g  New onset heart failure, NYHA functional class IV heart failure, or worsening existing heart failure)  · Denies significant arrhythmias such as high grade AV block, symptomatic ventricular arrhythmia, newly recognized ventricular tachycardia, supraventricular tachycardia with resting heart rate >100, or symptomatic bradycardia  · Denies severe heart valve disease including aortic stenosis or symptomatic mitral stenosis     Exercise Capacity:  · Able to walk 4 blocks without symptoms?: Yes  · Able to walk 2 flights without symptoms?: Yes     Prior Anesthesia Reactions: No     Personal history of venous thromboembolic disease? No     History of steroid use for >2 weeks within last year?  No           Review of Systems:      Review of Systems   Constitutional: Negative for fatigue, fever and unexpected weight change  HENT: Negative for congestion, sore throat and trouble swallowing  Eyes: Negative for visual disturbance  Respiratory: Positive for cough, chest tightness, shortness of breath and wheezing  Cardiovascular: Negative for chest pain, palpitations and leg swelling  Gastrointestinal: Negative for abdominal pain, constipation, diarrhea, nausea and vomiting  Endocrine: Negative for polyuria  Genitourinary: Positive for genital sores (reason for surgery)  Negative for decreased urine volume, difficulty urinating, dysuria, frequency and urgency  Musculoskeletal: Positive for arthralgias (knee pain, right)  Skin: Negative for rash  Neurological: Negative for dizziness, weakness, light-headedness, numbness and headaches  All other systems reviewed and are negative  Current Problem List:      Active Ambulatory Problems     Diagnosis Date Noted   • Penile wart 12/27/2022   • Anogenital warts 12/27/2022   • Postherpetic neuralgia 12/27/2022   • Moderate asthma 12/27/2022   • Asymptomatic HIV infection, with no history of HIV-related illness ([email or web address] ) 02/15/2023   • Tobacco use 02/15/2023   • Elevated BP without diagnosis of hypertension 02/15/2023     Resolved Ambulatory Problems     Diagnosis Date Noted   • Encounter for laboratory testing for COVID-19 virus 12/27/2022   • Viral syndrome 12/27/2022   • Closed displaced bicondylar fracture of right tibia 05/06/2021     Past Medical History:   Diagnosis Date   • Asthma    • HIV (human immunodeficiency virus infection) ([email or web address] )    • Hypertension           Allergies:       Allergies   Allergen Reactions   • Other Anaphylaxis     ALL SEAFOOD   • Red Dye - Food Allergy Shortness Of Breath   • Amoxicillin Swelling          Current Medications:        Current Outpatient Medications:   •  albuterol (PROVENTIL HFA,VENTOLIN HFA) 90 mcg/act inhaler, Inhale 2 puffs every 4 (four) hours as needed for wheezing, Disp: 18 g, Rfl: 3  •  benzocaine (ORAJEL) 10 % mucosal gel, Apply 1 application to the mouth or throat as needed for mucositis (Patient not taking: Reported on 1/18/2023), Disp: 5 3 g, Rfl: 0  •  benzocaine (ORAJEL) 10 % mucosal gel, Apply 1 application to the mouth or throat 3 (three) times a day as needed for mucositis, Disp: 5 3 g, Rfl: 0  •  Biktarvy -25 MG tablet, Take 1 tablet by mouth daily, Disp: , Rfl:   •  capsaicin (ZOSTRIX) 0 025 % cream, Apply 714 application topically 3 (three) times a day, Disp: 60 g, Rfl: 0  •  EPINEPHrine (EPIPEN) 0 3 mg/0 3 mL SOAJ, Inject 0 3 mL (0 3 mg total) into a muscle once for 1 dose, Disp: 0 6 mL, Rfl: 0  •  fluticasone (Flovent HFA) 44 mcg/act inhaler, Inhale 2 puffs 2 (two) times a day Rinse mouth after use , Disp: 10 6 g, Rfl: 3  •  ibuprofen (MOTRIN) 600 mg tablet, Take 1 tablet (600 mg total) by mouth every 6 (six) hours as needed for mild pain or moderate pain, Disp: 20 tablet, Rfl: 0  •  ketorolac (TORADOL) 10 mg tablet, Take 1 tablet (10 mg total) by mouth every 6 (six) hours as needed for moderate pain, Disp: 20 tablet, Rfl: 0  •  naproxen (NAPROSYN) 500 mg tablet, Take 1 tablet (500 mg total) by mouth 2 (two) times a day with meals (Patient taking differently: Take 500 mg by mouth 2 (two) times a day as needed), Disp: 15 tablet, Rfl: 0         Past Medical History:     Past Medical History:   Diagnosis Date   • Asthma    • Closed displaced bicondylar fracture of right tibia 5/6/2021   • HIV (human immunodeficiency virus infection) ([facility] )    • Hypertension      Past Surgical History:   Procedure Laterality Date   • HEMORROIDECTOMY     • PATELLA FRACTURE SURGERY Right          History reviewed  No pertinent family history       Social History     Socioeconomic History   • Marital status: Single     Spouse name: None   • Number of children: None   • Years of education: None   • Highest education level: None   Occupational History   • None   Tobacco Use   • Smoking status: Every Day Packs/day: 0 25     Types: Cigarettes     Passive exposure: Current   • Smokeless tobacco: Never   Vaping Use   • Vaping Use: Never used   Substance and Sexual Activity   • Alcohol use: Not Currently   • Drug use: Yes     Types: Marijuana     Comment: daily - "alot"   • Sexual activity: None   Other Topics Concern   • None   Social History Narrative   • None     Social Determinants of Health     Financial Resource Strain: Low Risk    • Difficulty of Paying Living Expenses: Not very hard   Food Insecurity: No Food Insecurity   • Worried About Running Out of Food in the Last Year: Never true   • Ran Out of Food in the Last Year: Never true   Transportation Needs: No Transportation Needs   • Lack of Transportation (Medical): No   • Lack of Transportation (Non-Medical): No   Physical Activity: Not on file   Stress: Not on file   Social Connections: Not on file   Intimate Partner Violence: Not on file   Housing Stability: Not on file          Physical Exam:      Visit Vitals  /84 (BP Location: Left arm, Patient Position: Sitting, Cuff Size: Large)   Pulse 84   Temp 97 8 °F (36 6 °C) (Temporal)   Resp 18   Ht 5' 9" (1 753 m)   Wt 94 3 kg (208 lb)   SpO2 98%   BMI 30 72 kg/m²   Smoking Status Every Day   BSA 2 1 m²          Physical Exam  Vitals reviewed  Constitutional:       General: He is not in acute distress  Appearance: He is obese  He is not ill-appearing or diaphoretic  HENT:      Head: Normocephalic and atraumatic  Right Ear: Tympanic membrane, ear canal and external ear normal       Left Ear: Tympanic membrane, ear canal and external ear normal       Nose: Nose normal       Mouth/Throat:      Mouth: Mucous membranes are moist       Pharynx: Oropharynx is clear  Eyes:      General: Lids are normal       Conjunctiva/sclera: Conjunctivae normal       Pupils: Pupils are equal, round, and reactive to light  Neck:      Thyroid: No thyromegaly or thyroid tenderness     Cardiovascular:      Rate and Rhythm: Normal rate and regular rhythm  Pulses: Normal pulses  Heart sounds: Normal heart sounds  No murmur heard  Pulmonary:      Effort: Pulmonary effort is normal  No tachypnea  Breath sounds: Normal breath sounds  No decreased breath sounds, wheezing or rales  Abdominal:      General: Bowel sounds are normal  There is no distension  Palpations: Abdomen is soft  Tenderness: There is no abdominal tenderness  There is no guarding  Musculoskeletal:      Cervical back: Neck supple  Right lower leg: No edema  Left lower leg: No edema  Lymphadenopathy:      Cervical: No cervical adenopathy  Skin:     General: Skin is warm and dry  Neurological:      Mental Status: He is alert and oriented to person, place, and time  Cranial Nerves: No cranial nerve deficit, dysarthria or facial asymmetry  Motor: Motor function is intact  No weakness  Gait: Gait is intact  Psychiatric:         Attention and Perception: Attention normal          Mood and Affect: Mood and affect normal          Speech: Speech normal          Behavior: Behavior normal          Thought Content: Thought content normal           Data:      Pre-operative work-up     Laboratory Results: No recent results available for review  EKG: none     Chest x-ray: none        Previous cardiopulmonary studies within the past year:  · Echocardiogram: none   · Cardiac Catheterization: none  · Stress Test: none  · Pulmonary Function Testing: none      Assessment & Recommendations:      Problem List Items Addressed This Visit        Respiratory    Moderate asthma     Uncontrolled  Pt confused about prescribed regimen  Reports daily symptoms but did not refill albuterol, using Flovent PRN  - Education provided regarding medication regimen and expected outcomes  - Start using Flovent 2 puffs BID every day  - Obtain albuterol refill from pharmacy for PRN use   - Stop smoking               Other    Asymptomatic HIV infection, with no history of HIV-related illness ([facility] )     Pt reports initial diagnosis June 2022, states viral load has been undetectable since July 2022 with Biktarvy therapy  Denies any symptoms  Recommended obtaining updated CD4 count and viral load  Pt states this was done at [facility] in December   - Pt to contact [employer] today to have blood work results faxed to this office  Pt aware if not able to obtain, to go to lab to have blood work drawn  Relevant Orders    T-helper cells (CD4) count    HIV-1 RNA, quantitative, PCR    Tobacco use     - Strongly advised smoking cessation for the benefit of overall health as well as effect on asthma control  Elevated BP without diagnosis of hypertension     Pt self-discontinued chlorthalidone about 3 weeks ago due to side effects  /84 in office today  - Follow up with PCP after surgery to discuss need for further medication   - Stop smoking    - Low salt diet and daily physical activity  Other Visit Diagnoses     Pre-op examination    -  Primary    Relevant Orders    T-helper cells (CD4) count    HIV-1 RNA, quantitative, PCR    CBC and differential    Comprehensive metabolic panel           Pre-Op Evaluation Assessment  33 y o  male with planned surgery: EXCISION CONDYLOMA PERINEAL (PENILE/VAGINAL) WITH LASER CO2 (Perineum)  Known risk factors for perioperative complications:   - HIV infection  - Persistent asthma  - Current every day tobacco use  - Mildly elevated blood pressure        Current medications which may produce withdrawal symptoms if withheld perioperatively: none  Pre-Op Evaluation Plan  1  Further preoperative workup as follows: pt to contact [facility] for recent lab results  If unable to obtain results, pt has lab slip and is aware to have labs drawn: CBC/diff, CMP, CD4 count, HIV viral load       2  Medication Management/Recommendations:   - Patient has been instructed to avoid herbs or non-directed vitamins the week prior to surgery to ensure no drug interactions with perioperative surgical and anesthetic medications  - Patient has been instructed to avoid aspirin containing medications or non-steroidal anti-inflammatory drugs for the week preceding surgery  3  Prophylaxis for cardiac events with perioperative beta-blockers: not indicated  4  Patient requires further consultation with: one     Clearance  Patient is conditionally CLEARED for surgery without any additional cardiac testing, pending receipt of lab results

## 2023-02-20 ENCOUNTER — TELEPHONE (OUTPATIENT)
Dept: FAMILY MEDICINE CLINIC | Facility: CLINIC | Age: 33
End: 2023-02-20

## 2023-02-20 ENCOUNTER — ANESTHESIA EVENT (OUTPATIENT)
Dept: PERIOP | Facility: HOSPITAL | Age: 33
End: 2023-02-20

## 2023-02-20 NOTE — TELEPHONE ENCOUNTER
Good morning  This is Erin De León from pre admission testing at [facility]  We have a patient coming for surgery on Wednesday [address]  Date of birth is [birth year]  He was seen on the 15th by Tono Lewis for medical clearance  At that time she said that he was cleared pending lab results  I did speak with the patient this morning  He said he signed a release with you to get those records from elsewhere  I was wondering if those results were obtained, whether Jonathan Sherman has reviewed them and can clear the patient  Your surgery, my phone number if you have any questions is [phone]  Again I'm hoping that Antonino date of birth 15 four 1990 had his lab results received and reviewed by Jonathan Sherman who can attend her note to give him clearance  Thank you      Reached out to clerical to verify if we signed a release and to where as I am not familiar with the lab mentioned in the visit note

## 2023-02-21 ENCOUNTER — TELEPHONE (OUTPATIENT)
Dept: FAMILY MEDICINE CLINIC | Facility: CLINIC | Age: 33
End: 2023-02-21

## 2023-02-21 NOTE — H&P
HISTORY AND PHYSICAL  ? ? Patient Name: Ozzie Warner Colon  Patient MRN: [id number]  Attending Provider: Yann Sue MD  Service: Urology  Chief Complaint    Genital wart    MAMIE Beckman is a 33 y o  male with 6 to 7 mm wart on penile shaft, possible other smaller ones  I plan laser treatment genital wart, possible biopsy  Potential risks and complications discussed, and informed consent was given by the patient  Medications  Meds/Allergies   No current facility-administered medications for this encounter  Cannot display prior to admission medications because the patient has not been admitted in this contact  No current facility-administered medications for this encounter      Current Outpatient Medications:   •  albuterol (PROVENTIL HFA,VENTOLIN HFA) 90 mcg/act inhaler, Inhale 2 puffs every 4 (four) hours as needed for wheezing, Disp: 18 g, Rfl: 3  •  benzocaine (ORAJEL) 10 % mucosal gel, Apply 1 application to the mouth or throat 3 (three) times a day as needed for mucositis, Disp: 5 3 g, Rfl: 0  •  Biktarvy -25 MG tablet, Take 1 tablet by mouth daily, Disp: , Rfl:   •  capsaicin (ZOSTRIX) 0 025 % cream, Apply 639 application topically 3 (three) times a day, Disp: 60 g, Rfl: 0  •  EPINEPHrine (EPIPEN) 0 3 mg/0 3 mL SOAJ, Inject 0 3 mL (0 3 mg total) into a muscle once for 1 dose, Disp: 0 6 mL, Rfl: 0  •  fluticasone (Flovent HFA) 44 mcg/act inhaler, Inhale 2 puffs 2 (two) times a day Rinse mouth after use , Disp: 10 6 g, Rfl: 3  •  ibuprofen (MOTRIN) 600 mg tablet, Take 1 tablet (600 mg total) by mouth every 6 (six) hours as needed for mild pain or moderate pain, Disp: 20 tablet, Rfl: 0  •  ketorolac (TORADOL) 10 mg tablet, Take 1 tablet (10 mg total) by mouth every 6 (six) hours as needed for moderate pain, Disp: 20 tablet, Rfl: 0  •  naproxen (NAPROSYN) 500 mg tablet, Take 1 tablet (500 mg total) by mouth 2 (two) times a day with meals (Patient taking differently: Take 500 mg by mouth 2 (two) times a day as needed), Disp: 15 tablet, Rfl: 0  •  benzocaine (ORAJEL) 10 % mucosal gel, Apply 1 application to the mouth or throat as needed for mucositis (Patient not taking: Reported on 1/18/2023), Disp: 5 3 g, Rfl: 0  Review of Systems  10 point review of systems negative except as noted in HPI  Allergies  Allergies   Allergen Reactions   • Other Anaphylaxis     ALL SEAFOOD   • Red Dye - Food Allergy Shortness Of Breath   • Amoxicillin Swelling     PMH  Past Medical History:   Diagnosis Date   • Asthma    • Closed displaced bicondylar fracture of right tibia 5/6/2021   • HIV (human immunodeficiency virus infection) ([facility] )    • Hypertension      Past surgical history  Past Surgical History:   Procedure Laterality Date   • HEMORROIDECTOMY     • PATELLA FRACTURE SURGERY Right      Social history  Social History     Tobacco Use   • Smoking status: Every Day     Packs/day: 0 25     Types: Cigarettes     Passive exposure: Current   • Smokeless tobacco: Never   Vaping Use   • Vaping Use: Never used   Substance Use Topics   • Alcohol use: Not Currently   • Drug use: Yes     Types: Marijuana     Comment: daily - "alot"     ?   Physical Exam     vs  General appearance: alert and oriented, in no acute distress  Head: Normocephalic, without obvious abnormality, atraumatic  Throat: lips, mucosa, and tongue normal; teeth and gums normal  Neck: no adenopathy, no carotid bruit, no JVD, supple, symmetrical, trachea midline and thyroid not enlarged, symmetric, no tenderness/mass/nodules  Lungs: clear to auscultation bilaterally  Heart: regular rate and rhythm, S1, S2 normal, no murmur, click, rub or gallop  Abdomen: soft, non-tender; bowel sounds normal; no masses,  no organomegaly  Extremities: extremities normal, warm and well-perfused; no cyanosis, clubbing, or edema  Gilda Mercedes MD

## 2023-02-21 NOTE — TELEPHONE ENCOUNTER
Per Christopher, the pt barely filled out his name on the release but he's going to send me results to my fax   Will take them directly to provider for review once received

## 2023-02-21 NOTE — TELEPHONE ENCOUNTER
Received lab results from [facility] via fax today 2/21/23  Adequate to proceed with planned procedure under general anesthesia  Pre-op note addended

## 2023-02-22 ENCOUNTER — ANESTHESIA (OUTPATIENT)
Dept: PERIOP | Facility: HOSPITAL | Age: 33
End: 2023-02-22

## 2023-02-22 ENCOUNTER — HOSPITAL ENCOUNTER (OUTPATIENT)
Facility: HOSPITAL | Age: 33
Setting detail: OUTPATIENT SURGERY
Discharge: HOME | End: 2023-02-22
Attending: UROLOGY | Admitting: UROLOGY

## 2023-02-22 VITALS
DIASTOLIC BLOOD PRESSURE: 69 MMHG | HEART RATE: 73 BPM | SYSTOLIC BLOOD PRESSURE: 132 MMHG | OXYGEN SATURATION: 94 % | RESPIRATION RATE: 16 BRPM | TEMPERATURE: 98.3 F

## 2023-02-22 DIAGNOSIS — T65.291A TOXIC EFFECT OF TOBACCO AND NICOTINE, ACCIDENTAL OR UNINTENTIONAL, INITIAL ENCOUNTER: Primary | ICD-10-CM

## 2023-02-22 DIAGNOSIS — A63.0 PENILE WART: ICD-10-CM

## 2023-02-22 RX ORDER — FENTANYL CITRATE 50 UG/ML
INJECTION, SOLUTION INTRAMUSCULAR; INTRAVENOUS AS NEEDED
Status: DISCONTINUED | OUTPATIENT
Start: 2023-02-22 | End: 2023-02-22

## 2023-02-22 RX ORDER — PROPOFOL 10 MG/ML
INJECTION, EMULSION INTRAVENOUS AS NEEDED
Status: DISCONTINUED | OUTPATIENT
Start: 2023-02-22 | End: 2023-02-22

## 2023-02-22 RX ORDER — ONDANSETRON 2 MG/ML
INJECTION INTRAMUSCULAR; INTRAVENOUS AS NEEDED
Status: DISCONTINUED | OUTPATIENT
Start: 2023-02-22 | End: 2023-02-22

## 2023-02-22 RX ORDER — KETOROLAC TROMETHAMINE 30 MG/ML
INJECTION, SOLUTION INTRAMUSCULAR; INTRAVENOUS AS NEEDED
Status: DISCONTINUED | OUTPATIENT
Start: 2023-02-22 | End: 2023-02-22

## 2023-02-22 RX ORDER — OXYCODONE HYDROCHLORIDE AND ACETAMINOPHEN 5; 325 MG/1; MG/1
1 TABLET ORAL EVERY 4 HOURS PRN
Status: DISCONTINUED | OUTPATIENT
Start: 2023-02-22 | End: 2023-02-22 | Stop reason: HOSPADM

## 2023-02-22 RX ORDER — HYDROMORPHONE HCL/PF 1 MG/ML
SYRINGE (ML) INJECTION AS NEEDED
Status: DISCONTINUED | OUTPATIENT
Start: 2023-02-22 | End: 2023-02-22

## 2023-02-22 RX ORDER — LIDOCAINE HYDROCHLORIDE 20 MG/ML
INJECTION, SOLUTION EPIDURAL; INFILTRATION; INTRACAUDAL; PERINEURAL AS NEEDED
Status: DISCONTINUED | OUTPATIENT
Start: 2023-02-22 | End: 2023-02-22

## 2023-02-22 RX ORDER — FENTANYL CITRATE/PF 50 MCG/ML
25 SYRINGE (ML) INJECTION
Status: DISCONTINUED | OUTPATIENT
Start: 2023-02-22 | End: 2023-02-22 | Stop reason: HOSPADM

## 2023-02-22 RX ORDER — ONDANSETRON 2 MG/ML
4 INJECTION INTRAMUSCULAR; INTRAVENOUS ONCE AS NEEDED
Status: DISCONTINUED | OUTPATIENT
Start: 2023-02-22 | End: 2023-02-22 | Stop reason: HOSPADM

## 2023-02-22 RX ORDER — CEFAZOLIN SODIUM 2 G/50ML
SOLUTION INTRAVENOUS AS NEEDED
Status: DISCONTINUED | OUTPATIENT
Start: 2023-02-22 | End: 2023-02-22

## 2023-02-22 RX ORDER — OXYCODONE HYDROCHLORIDE AND ACETAMINOPHEN 5; 325 MG/1; MG/1
1-2 TABLET ORAL EVERY 6 HOURS PRN
Qty: 20 TABLET | Refills: 0 | Status: SHIPPED | OUTPATIENT
Start: 2023-02-22 | End: 2023-03-01 | Stop reason: SDUPTHER

## 2023-02-22 RX ORDER — BUPIVACAINE HYDROCHLORIDE 5 MG/ML
INJECTION, SOLUTION PERINEURAL AS NEEDED
Status: DISCONTINUED | OUTPATIENT
Start: 2023-02-22 | End: 2023-02-22 | Stop reason: HOSPADM

## 2023-02-22 RX ORDER — MEPERIDINE HYDROCHLORIDE 25 MG/ML
12.5 INJECTION INTRAMUSCULAR; INTRAVENOUS; SUBCUTANEOUS
Status: DISCONTINUED | OUTPATIENT
Start: 2023-02-22 | End: 2023-02-22 | Stop reason: HOSPADM

## 2023-02-22 RX ORDER — MIDAZOLAM HYDROCHLORIDE 2 MG/2ML
INJECTION, SOLUTION INTRAMUSCULAR; INTRAVENOUS AS NEEDED
Status: DISCONTINUED | OUTPATIENT
Start: 2023-02-22 | End: 2023-02-22

## 2023-02-22 RX ORDER — ACETAMINOPHEN 325 MG/1
650 TABLET ORAL EVERY 6 HOURS PRN
COMMUNITY

## 2023-02-22 RX ORDER — DEXAMETHASONE SODIUM PHOSPHATE 10 MG/ML
INJECTION, SOLUTION INTRAMUSCULAR; INTRAVENOUS AS NEEDED
Status: DISCONTINUED | OUTPATIENT
Start: 2023-02-22 | End: 2023-02-22

## 2023-02-22 RX ORDER — CEFAZOLIN SODIUM 2 G/50ML
2000 SOLUTION INTRAVENOUS ONCE
Status: DISCONTINUED | OUTPATIENT
Start: 2023-02-22 | End: 2023-02-22 | Stop reason: HOSPADM

## 2023-02-22 RX ORDER — KETOROLAC TROMETHAMINE 30 MG/ML
30 INJECTION, SOLUTION INTRAMUSCULAR; INTRAVENOUS ONCE
Status: COMPLETED | OUTPATIENT
Start: 2023-02-22 | End: 2023-02-22

## 2023-02-22 RX ORDER — OXYCODONE HYDROCHLORIDE AND ACETAMINOPHEN 5; 325 MG/1; MG/1
2 TABLET ORAL ONCE
Status: COMPLETED | OUTPATIENT
Start: 2023-02-22 | End: 2023-02-22

## 2023-02-22 RX ORDER — HYDROMORPHONE HCL/PF 1 MG/ML
0.5 SYRINGE (ML) INJECTION
Status: DISCONTINUED | OUTPATIENT
Start: 2023-02-22 | End: 2023-02-22 | Stop reason: HOSPADM

## 2023-02-22 RX ORDER — SODIUM CHLORIDE, SODIUM LACTATE, POTASSIUM CHLORIDE, CALCIUM CHLORIDE 600; 310; 30; 20 MG/100ML; MG/100ML; MG/100ML; MG/100ML
125 INJECTION, SOLUTION INTRAVENOUS CONTINUOUS
Status: DISCONTINUED | OUTPATIENT
Start: 2023-02-22 | End: 2023-02-22 | Stop reason: HOSPADM

## 2023-02-22 RX ADMIN — ONDANSETRON 4 MG: 2 INJECTION INTRAMUSCULAR; INTRAVENOUS at 16:07

## 2023-02-22 RX ADMIN — FENTANYL CITRATE 100 MCG: 50 INJECTION INTRAMUSCULAR; INTRAVENOUS at 16:01

## 2023-02-22 RX ADMIN — SODIUM CHLORIDE, SODIUM LACTATE, POTASSIUM CHLORIDE, AND CALCIUM CHLORIDE 125 ML/HR: 600; 310; 30; 20 INJECTION, SOLUTION INTRAVENOUS at 14:22

## 2023-02-22 RX ADMIN — OXYCODONE HYDROCHLORIDE AND ACETAMINOPHEN 2 TABLET: 5; 325 TABLET ORAL at 16:55

## 2023-02-22 RX ADMIN — HYDROMORPHONE HYDROCHLORIDE 0.5 MG: 1 INJECTION, SOLUTION INTRAMUSCULAR; INTRAVENOUS; SUBCUTANEOUS at 16:18

## 2023-02-22 RX ADMIN — CEFAZOLIN SODIUM 2000 MG: 2 SOLUTION INTRAVENOUS at 15:56

## 2023-02-22 RX ADMIN — MIDAZOLAM 2 MG: 1 INJECTION INTRAMUSCULAR; INTRAVENOUS at 15:53

## 2023-02-22 RX ADMIN — KETOROLAC TROMETHAMINE 30 MG: 30 INJECTION, SOLUTION INTRAMUSCULAR; INTRAVENOUS at 16:56

## 2023-02-22 RX ADMIN — DEXAMETHASONE SODIUM PHOSPHATE 10 MG: 10 INJECTION INTRAMUSCULAR; INTRAVENOUS at 16:07

## 2023-02-22 RX ADMIN — LIDOCAINE HYDROCHLORIDE 100 MG: 20 INJECTION, SOLUTION EPIDURAL; INFILTRATION; INTRACAUDAL; PERINEURAL at 16:01

## 2023-02-22 RX ADMIN — PROPOFOL 300 MG: 10 INJECTION, EMULSION INTRAVENOUS at 16:01

## 2023-02-22 RX ADMIN — SODIUM CHLORIDE, SODIUM LACTATE, POTASSIUM CHLORIDE, AND CALCIUM CHLORIDE 125 ML/HR: 600; 310; 30; 20 INJECTION, SOLUTION INTRAVENOUS at 17:05

## 2023-02-22 RX ADMIN — KETOROLAC TROMETHAMINE 30 MG: 30 INJECTION, SOLUTION INTRAMUSCULAR at 16:25

## 2023-02-22 NOTE — OP NOTE
OPERATIVE REPORT  PATIENT NAME: Gabriela Gilbert    :  1990  MRN: [id number]  Pt Location: [facility] OR ROOM 01    SURGERY DATE: 2023    Surgeon(s) and Role:     * Jacques Damon MD - Primary    Preop Diagnosis:  Penile wart [A63 0]    Post-Op Diagnosis Codes:     * Penile wart [A63 0]    Procedure(s):  EXCISION CONDYLOMA PERINEAL (PENILE/VAGINAL) W/LASER CO2    Specimen(s):  ID Type Source Tests Collected by Time Destination   1 : penile condyloma Tissue Penis TISSUE EXAM Jacques Damon MD 2023  4:24 PM        Estimated Blood Loss:   Minimal    Drains:  * No LDAs found *    Anesthesia Type:   General    Operative Indications:  Penile wart [A63 0]      Operative Findings:  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, just proximal to corona    Complications:   None    Procedure and Technique:  After the patient was placed under adequate general anesthesia, he was prepped and draped using chlorhexidine solution in lithotomy supine position  The main lesion, 5 mm diameter, was excised using scissors  The carbon dioxide laser was used on 4 to 11 W setting to photo vaporize multiple condyloma lesions on the penis, mainly on the right side  Careful inspection was done, find no other lesions besides the ones treated  A penile dressing was placed  1/2% Marcaine was used as a penile block at the start  Patient taken to recovery in good condition     I was present for the entire procedure    Patient Disposition:  PACU         SIGNATURE: Jacques Damon MD  DATE: 2023  TIME: 4:39 PM

## 2023-02-22 NOTE — INTERVAL H&P NOTE
H&P reviewed  After examining the patient I find no changes in the patients condition since the H&P had been written      Vitals:    02/22/23 1352   BP: 137/80   Pulse: 84   Resp: 16   Temp: 97 9 °F (36 6 °C)   SpO2: 95%

## 2023-02-22 NOTE — ANESTHESIA POSTPROCEDURE EVALUATION
Post-Op Assessment Note    CV Status:  Stable  Pain Score: 0    Pain management: adequate     Mental Status:  Alert and awake   Hydration Status:  Euvolemic   PONV Controlled:  Controlled   Airway Patency:  Patent      Post Op Vitals Reviewed: Yes      Staff: Anesthesiologist         No notable events documented      BP      Temp      Pulse     Resp      SpO2

## 2023-02-22 NOTE — ANESTHESIA PREPROCEDURE EVALUATION
Procedure:  EXCISION CONDYLOMA PERINEAL (PENILE/VAGINAL) W/LASER CO2 (Perineum)    Relevant Problems   CARDIO   (+) Hypertension      PULMONARY   (+) Moderate asthma      Nervous and Auditory   (+) Postherpetic neuralgia      Musculoskeletal and Integument   (+) Penile wart      Other   (+) Anogenital warts   (+) Asymptomatic HIV infection, with no history of HIV-related illness (HCC)   (+) Tobacco use        Physical Exam    Airway    Mallampati score: II  TM Distance: >3 FB  Neck ROM: full     Dental   No notable dental hx     Cardiovascular  Rhythm: regular, Rate: normal, Cardiovascular exam normal    Pulmonary  Pulmonary exam normal Breath sounds clear to auscultation,     Other Findings        Anesthesia Plan  ASA Score- 2     Anesthesia Type- general with ASA Monitors  Additional Monitors:   Airway Plan: LMA  Plan Factors-    Patient summary reviewed  Patient is a current smoker  Patient instructed to abstain from smoking on day of procedure  There is medical exclusion for perioperative obstructive sleep apnea risk education  Induction- intravenous  Postoperative Plan-     Informed Consent- Anesthetic plan and risks discussed with patient

## 2023-02-23 ENCOUNTER — TELEPHONE (OUTPATIENT)
Dept: UROLOGY | Facility: AMBULATORY SURGERY CENTER | Age: 33
End: 2023-02-23

## 2023-02-23 NOTE — TELEPHONE ENCOUNTER
Post Op Note    Kimmy Justin is a 33 y o  male s/p Condyloma performed 02/22/23  Kimmy Justin is a patient of Dr Elfego Fuchs and is seen at the [facility] office  How would you rate your pain on a scale from 1 to 10, 10 being the worst pain ever? 1  Have you had a fever? No  Have your bowel movements been regular? Yes  Do you have any difficulty urinating? No  If the patient has an incision- do you have any redness around the incision or any drainage from the incision? No  Do you have any other questions or concerns that I can address at this time?  Follow up 3/15 @ 1:00 in [place]

## 2023-03-01 ENCOUNTER — NURSE TRIAGE (OUTPATIENT)
Dept: OTHER | Facility: OTHER | Age: 33
End: 2023-03-01

## 2023-03-01 DIAGNOSIS — A63.0 PENILE WART: ICD-10-CM

## 2023-03-01 RX ORDER — OXYCODONE HYDROCHLORIDE AND ACETAMINOPHEN 5; 325 MG/1; MG/1
TABLET ORAL
Qty: 20 TABLET | Refills: 0 | Status: SHIPPED | OUTPATIENT
Start: 2023-03-01

## 2023-03-01 NOTE — TELEPHONE ENCOUNTER
Regarding: Post surgery pain, redness  ----- Message from Sia Diaz sent at 3/1/2023 12:12 PM EST -----  I had a EXCISION CONDYLOMA PERINEAL procedure on 2/22/23 and I am still in a lot of pain and the stitches have not come out yet  I am out of my pain meds and doctor only gave me enough for 3 days  The incision site is red and it looks the same as the day of procedure

## 2023-03-01 NOTE — TELEPHONE ENCOUNTER
I spoke with the patient and let him know Dr Hernandez will send over some oxycodone to his pharmacy on file  I did speak with Dr Hernandez and was advised to tell him the stitch can take a couple weeks to dissolve and can have some irritation and nasty scabs from the incisions  I scheduled him to come in on Friday to evaluate the area

## 2023-03-01 NOTE — TELEPHONE ENCOUNTER
Called and spoke with patient, reports having a lot of pain and discomfort  Reports that stitch is still there  Reports had to leave work today due tot he discomfort  States that incision looks the same and nothing looks different  Has some redness but no drainage  Denies any fever or chills  Nothing over the counter is working, pt reports pain is at a level 8  Pt states he almost went to the ED  Pt is very upset

## 2023-03-01 NOTE — PROGRESS NOTES
Patient underwent extensive condyloma treatment last week  Removal of a 5 mm lesion and laser of associated lesions around it  At time of surgery, 4-0 chromic used to close the lesion was used  However, there were other small lesions next to it that required lasering so likely there is eschar in the suture closure  I expect this to take a while to heal   Patient called today with severe pain, we  will refill his pain meds and see him tomorrow for evaluation  Of note, when I did this procedure last week, the anesthesiologist told me she took care of the patient when he had  condyloma perianal treated at another facility 3 months ago  She said at that time he had tremendous pain from the condyloma and treatment, and needed lots of pain relief  Looking at the database, he has been refilled pain meds by the colorectal surgeon several times in the past 2 months

## 2023-03-01 NOTE — TELEPHONE ENCOUNTER
Patient called in to report severe pain post op procedure 2/22/23  Reports stitch has not fallen out as advised it would post op day#1  Also reports burning, itching, and redness at site  Patient had to leave work early today due to pain and symptoms  Patient has no pain medication  Please follow up with patient  Reason for Disposition  • SEVERE post-op pain (e g , excruciating, pain scale 8-10) that is not controlled with pain medications    Answer Assessment - Initial Assessment Questions  1  SYMPTOM: "What's the main symptom you're concerned about?" (e g , pain, fever, vomiting)      Severe pain; stitch has not fallen out one day post op as advised; burning and itching, redness at site  2  ONSET: "When did symptoms start?"      2/25/23  3  SURGERY: "What surgery was performed?"      EXCISION CONDYLOMA PERINEAL (PENILE/VAGINAL) W/LASER CO2 (Perineum)  4  DATE of SURGERY: "When was surgery performed?"       2/22/23  5  ANESTHESIA: " What type of anesthesia did you have?" (e g , general, spinal, epidural, local)      General  6  PAIN: "Is there any pain?" If Yes, ask: "How bad is it?"  (Scale 1-10; or mild, moderate, severe)      Severe (8)  7  FEVER: "Do you have a fever?" If Yes, ask: "What is your temperature, how was it measured, and when did it start?"      Denies  8  VOMITING: "Is there any vomiting?" If yes, ask: "How many times?"      Denies  9  BLEEDING: "Is there any bleeding?" If Yes, ask: "How much?" and "Where?"      Denies; looks like raw meat  10   OTHER SYMPTOMS: "Do you have any other symptoms?" (e g , drainage from wound, painful urination, constipation)        Denies    Protocols used: POST-OP SYMPTOMS AND QUESTIONS-ADULT-[place]

## 2023-03-13 ENCOUNTER — HOSPITAL ENCOUNTER (EMERGENCY)
Facility: HOSPITAL | Age: 33
Discharge: HOME | End: 2023-03-13
Attending: EMERGENCY MEDICINE | Admitting: EMERGENCY MEDICINE

## 2023-03-13 ENCOUNTER — APPOINTMENT (EMERGENCY)
Dept: CT IMAGING | Facility: HOSPITAL | Age: 33
End: 2023-03-13

## 2023-03-13 VITALS
RESPIRATION RATE: 20 BRPM | SYSTOLIC BLOOD PRESSURE: 133 MMHG | BODY MASS INDEX: 29.33 KG/M2 | OXYGEN SATURATION: 100 % | WEIGHT: 198.63 LBS | TEMPERATURE: 97.9 F | HEART RATE: 79 BPM | DIASTOLIC BLOOD PRESSURE: 78 MMHG

## 2023-03-13 DIAGNOSIS — K52.9 GASTROENTERITIS: Primary | ICD-10-CM

## 2023-03-13 LAB
ALBUMIN SERPL BCP-MCNC: 4.7 G/DL (ref 3.5–5)
ALP SERPL-CCNC: 74 U/L (ref 43–122)
ALT SERPL W P-5'-P-CCNC: 30 U/L
ANION GAP SERPL CALCULATED.3IONS-SCNC: 9 MMOL/L (ref 5–14)
AST SERPL W P-5'-P-CCNC: 32 U/L (ref 17–59)
ATRIAL RATE: 73 BPM
BASOPHILS # BLD AUTO: 0.06 THOUSANDS/ÂΜL (ref 0–0.1)
BASOPHILS NFR BLD AUTO: 0 % (ref 0–1)
BILIRUB SERPL-MCNC: 0.62 MG/DL (ref 0.2–1)
BUN SERPL-MCNC: 9 MG/DL (ref 5–25)
CALCIUM SERPL-MCNC: 9.5 MG/DL (ref 8.4–10.2)
CHLORIDE SERPL-SCNC: 107 MMOL/L (ref 96–108)
CO2 SERPL-SCNC: 22 MMOL/L (ref 21–32)
CREAT SERPL-MCNC: 0.86 MG/DL (ref 0.7–1.5)
EOSINOPHIL # BLD AUTO: 0.22 THOUSAND/ÂΜL (ref 0–0.61)
EOSINOPHIL NFR BLD AUTO: 1 % (ref 0–6)
ERYTHROCYTE [DISTWIDTH] IN BLOOD BY AUTOMATED COUNT: 12.6 % (ref 11.6–15.1)
GFR SERPL CREATININE-BSD FRML MDRD: 115 ML/MIN/1.73SQ M
GLUCOSE SERPL-MCNC: 109 MG/DL (ref 70–99)
HCT VFR BLD AUTO: 51.6 % (ref 36.5–49.3)
HGB BLD-MCNC: 16.9 G/DL (ref 12–17)
IMM GRANULOCYTES # BLD AUTO: 0.07 THOUSAND/UL (ref 0–0.2)
IMM GRANULOCYTES NFR BLD AUTO: 0 % (ref 0–2)
LIPASE SERPL-CCNC: 55 U/L (ref 23–300)
LYMPHOCYTES # BLD AUTO: 1.31 THOUSANDS/ÂΜL (ref 0.6–4.47)
LYMPHOCYTES NFR BLD AUTO: 7 % (ref 14–44)
MCH RBC QN AUTO: 31.5 PG (ref 26.8–34.3)
MCHC RBC AUTO-ENTMCNC: 32.8 G/DL (ref 31.4–37.4)
MCV RBC AUTO: 96 FL (ref 82–98)
MONOCYTES # BLD AUTO: 1.15 THOUSAND/ÂΜL (ref 0.17–1.22)
MONOCYTES NFR BLD AUTO: 6 % (ref 4–12)
NEUTROPHILS # BLD AUTO: 15.74 THOUSANDS/ÂΜL (ref 1.85–7.62)
NEUTS SEG NFR BLD AUTO: 86 % (ref 43–75)
NRBC BLD AUTO-RTO: 0 /100 WBCS
P AXIS: 68 DEGREES
PLATELET # BLD AUTO: 261 THOUSANDS/UL (ref 149–390)
PMV BLD AUTO: 11 FL (ref 8.9–12.7)
POTASSIUM SERPL-SCNC: 4.3 MMOL/L (ref 3.5–5.3)
PR INTERVAL: 138 MS
PROT SERPL-MCNC: 8.9 G/DL (ref 6.4–8.4)
QRS AXIS: 90 DEGREES
QRSD INTERVAL: 92 MS
QT INTERVAL: 356 MS
QTC INTERVAL: 392 MS
RBC # BLD AUTO: 5.37 MILLION/UL (ref 3.88–5.62)
SODIUM SERPL-SCNC: 138 MMOL/L (ref 135–147)
T WAVE AXIS: 55 DEGREES
VENTRICULAR RATE: 73 BPM
WBC # BLD AUTO: 18.55 THOUSAND/UL (ref 4.31–10.16)

## 2023-03-13 RX ORDER — ONDANSETRON 2 MG/ML
4 INJECTION INTRAMUSCULAR; INTRAVENOUS ONCE
Status: COMPLETED | OUTPATIENT
Start: 2023-03-13 | End: 2023-03-13

## 2023-03-13 RX ORDER — DICYCLOMINE HCL 20 MG
20 TABLET ORAL ONCE
Status: COMPLETED | OUTPATIENT
Start: 2023-03-13 | End: 2023-03-13

## 2023-03-13 RX ORDER — DICYCLOMINE HCL 20 MG
20 TABLET ORAL 2 TIMES DAILY
Qty: 20 TABLET | Refills: 0 | Status: SHIPPED | OUTPATIENT
Start: 2023-03-13

## 2023-03-13 RX ORDER — ONDANSETRON 4 MG/1
4 TABLET, ORALLY DISINTEGRATING ORAL EVERY 6 HOURS PRN
Qty: 20 TABLET | Refills: 0 | Status: SHIPPED | OUTPATIENT
Start: 2023-03-13

## 2023-03-13 RX ORDER — ONDANSETRON 4 MG/1
4 TABLET, ORALLY DISINTEGRATING ORAL ONCE
Status: COMPLETED | OUTPATIENT
Start: 2023-03-13 | End: 2023-03-13

## 2023-03-13 RX ADMIN — ONDANSETRON 4 MG: 2 INJECTION INTRAMUSCULAR; INTRAVENOUS at 20:19

## 2023-03-13 RX ADMIN — ONDANSETRON 4 MG: 4 TABLET, ORALLY DISINTEGRATING ORAL at 23:43

## 2023-03-13 RX ADMIN — DICYCLOMINE HYDROCHLORIDE 20 MG: 20 TABLET ORAL at 23:43

## 2023-03-13 RX ADMIN — IOHEXOL 100 ML: 350 INJECTION, SOLUTION INTRAVENOUS at 23:25

## 2023-03-13 RX ADMIN — SODIUM CHLORIDE 1000 ML: 0.9 INJECTION, SOLUTION INTRAVENOUS at 20:04

## 2023-03-13 NOTE — ED PROVIDER NOTES
History  Chief Complaint   Patient presents with   • Vomiting     Patient reports since 1pm, nausea, vomiting and diarrhea  Also feeling weakness  Patient is a 41-year-old male with a 1 day h/o nvd   8+ episodes of vomiting  No bile/blood  Multiple episodes of diarrhea  No sick contacts, no recent antibiotics  Did have [address] last night  No meds at home   +dizziness  Worse with positional change  Self extinguishes  Patient is just concerned due to having HIV  Currently undectable and on meds  No f/s/c  Prior to Admission Medications   Prescriptions Last Dose Informant Patient Reported? Taking? Biktarvy -25 MG tablet   Yes No   Sig: Take 1 tablet by mouth daily   albuterol (PROVENTIL HFA,VENTOLIN HFA) 90 mcg/act inhaler   No No   Sig: Inhale 2 puffs every 4 (four) hours as needed for wheezing   benzocaine (ORAJEL) 10 % mucosal gel   No No   Sig: Apply 1 application to the mouth or throat 3 (three) times a day as needed for mucositis   capsaicin (ZOSTRIX) 0 025 % cream   No No   Sig: Apply 791 application topically 3 (three) times a day   fluticasone (Flovent HFA) 44 mcg/act inhaler   No No   Sig: Inhale 2 puffs 2 (two) times a day Rinse mouth after use  Facility-Administered Medications: None       Past Medical History:   Diagnosis Date   • Closed displaced bicondylar fracture of right tibia 5/6/2021   • Hypertension        Past Surgical History:   Procedure Laterality Date   • HEMORROIDECTOMY     • LASER ABLATION OF CONDYLOMAS N/A 2/22/2023    Procedure: EXCISION CONDYLOMA PERINEAL (PENILE/VAGINAL) W/LASER CO2;  Surgeon: Raiza Du MD;  Location: [facility];  Service: Urology   • PATELLA FRACTURE SURGERY Right        History reviewed  No pertinent family history  I have reviewed and agree with the history as documented      E-Cigarette/Vaping   • E-Cigarette Use Never User      E-Cigarette/Vaping Substances   • Nicotine No    • THC No    • CBD No    • Flavoring No    • Other No    • Unknown No      Social History     Tobacco Use   • Smoking status: Every Day     Packs/day: 0 25     Types: Cigarettes     Passive exposure: Current   • Smokeless tobacco: Never   Vaping Use   • Vaping Use: Never used   Substance Use Topics   • Alcohol use: Not Currently   • Drug use: Yes     Types: Marijuana     Comment: daily - "alot"       Review of Systems   Constitutional: Positive for appetite change  HENT: Negative  Eyes: Negative  Respiratory: Negative  Cardiovascular: Negative  Gastrointestinal: Positive for diarrhea, nausea and vomiting  Endocrine: Negative  Genitourinary: Negative  Musculoskeletal: Negative  Skin: Negative  Allergic/Immunologic: Negative  Neurological: Negative  Hematological: Negative  Psychiatric/Behavioral: Negative  All other systems reviewed and are negative  Physical Exam  Physical Exam  Vitals and nursing note reviewed  Constitutional:       Appearance: Normal appearance  He is normal weight  HENT:      Head: Normocephalic and atraumatic  Nose: Nose normal       Mouth/Throat:      Mouth: Mucous membranes are moist       Pharynx: Oropharynx is clear  Eyes:      Conjunctiva/sclera: Conjunctivae normal       Pupils: Pupils are equal, round, and reactive to light  Cardiovascular:      Rate and Rhythm: Normal rate and regular rhythm  Pulses: Normal pulses  Pulmonary:      Effort: Pulmonary effort is normal       Breath sounds: Normal breath sounds  Abdominal:      General: Bowel sounds are normal  There is no distension  Palpations: Abdomen is soft  Tenderness: There is no abdominal tenderness  There is no guarding or rebound  Hernia: No hernia is present  Musculoskeletal:         General: Normal range of motion  Cervical back: Normal range of motion and neck supple  Skin:     General: Skin is warm and dry  Capillary Refill: Capillary refill takes less than 2 seconds  Neurological:      General: No focal deficit present  Mental Status: He is alert and oriented to person, place, and time     Psychiatric:         Mood and Affect: Mood normal          Behavior: Behavior normal          Vital Signs  ED Triage Vitals [03/13/23 1942]   Temperature Pulse Respirations Blood Pressure SpO2   97 9 °F (36 6 °C) 82 20 111/77 98 %      Temp Source Heart Rate Source Patient Position - Orthostatic VS BP Location FiO2 (%)   Tympanic Monitor Lying Left arm --      Pain Score       --           Vitals:    03/13/23 1942   BP: 111/77   Pulse: 82   Patient Position - Orthostatic VS: Lying         Visual Acuity      ED Medications  Medications   ondansetron (ZOFRAN) injection 4 mg (has no administration in time range)   sodium chloride 0 9 % bolus 1,000 mL (has no administration in time range)       Diagnostic Studies  Results Reviewed     Procedure Component Value Units Date/Time    CBC and differential [[id number]]     Lab Status: No result Specimen: Blood     Comprehensive metabolic panel [[id number]]     Lab Status: No result Specimen: Blood     Lipase [[id number]]     Lab Status: No result Specimen: Blood                  No orders to display              Procedures  ECG 12 Lead Documentation Only    Date/Time: 3/13/2023 7:41 PM  Performed by: David Lundberg MD  Authorized by: David Lundberg MD     Indications / Diagnosis:  Dizziness  ECG reviewed by me, the ED Provider: yes    Patient location:  ED  Interpretation:     Interpretation: normal    Rate:     ECG rate:  73    ECG rate assessment: normal    Rhythm:     Rhythm: sinus rhythm    Ectopy:     Ectopy: none    QRS:     QRS axis:  Normal    QRS intervals:  Normal  Conduction:     Conduction: normal    ST segments:     ST segments:  Normal  T waves:     T waves: normal               ED Course                                             MDM    Disposition  Final diagnoses:   None     ED Disposition     None      Follow-up Information    None         Patient's Medications   Discharge Prescriptions    No medications on file       No discharge procedures on file      PDMP Review       Value Time User    PDMP Reviewed  Yes 2/15/2023  9:28 AM Kandace Valderrama, [address]          ED Provider  Electronically Signed by           Sanjeev Weber MD  03/13/23 2002

## 2023-03-13 NOTE — Clinical Note
Corey Gilbert was seen and treated in our emergency department on 3/13/2023  Diagnosis:     Corey    He may return on this date: If you have any questions or concerns, please don't hesitate to call        Lawyer Tata MD    ______________________________           _______________          _______________  Hospital Representative                              Date                                Time

## 2023-03-20 ENCOUNTER — OFFICE VISIT (OUTPATIENT)
Dept: UROLOGY | Facility: MEDICAL CENTER | Age: 33
End: 2023-03-20

## 2023-03-20 VITALS
DIASTOLIC BLOOD PRESSURE: 80 MMHG | HEIGHT: 69 IN | SYSTOLIC BLOOD PRESSURE: 130 MMHG | HEART RATE: 67 BPM | WEIGHT: 196 LBS | BODY MASS INDEX: 29.03 KG/M2

## 2023-03-20 DIAGNOSIS — A63.0 ANOGENITAL WARTS: Primary | ICD-10-CM

## 2023-03-24 NOTE — PROGRESS NOTES
Treatment of numerous condyloma on penile skin 1 month ago  1 large lesion was excised and sutured closed  The rest were lasered  He is healing slowly, on the left side of shaft is a 1 cm area of pink eschar, dry, healing very slowly    No evidence of infection or cellulitis    He is frustrated at the length of time it takes to heal, understandably      We will see him back in 1 month to reevaluate

## 2023-04-19 ENCOUNTER — HOSPITAL ENCOUNTER (EMERGENCY)
Facility: HOSPITAL | Age: 33
Discharge: HOME | End: 2023-04-19
Attending: STUDENT IN AN ORGANIZED HEALTH CARE EDUCATION/TRAINING PROGRAM | Admitting: EMERGENCY MEDICINE

## 2023-04-19 VITALS
HEART RATE: 77 BPM | DIASTOLIC BLOOD PRESSURE: 57 MMHG | RESPIRATION RATE: 18 BRPM | SYSTOLIC BLOOD PRESSURE: 104 MMHG | TEMPERATURE: 98 F

## 2023-04-19 DIAGNOSIS — K02.9 DENTAL CARIES: ICD-10-CM

## 2023-04-19 DIAGNOSIS — K08.89 PAIN, DENTAL: Primary | ICD-10-CM

## 2023-04-19 RX ORDER — OXYCODONE HYDROCHLORIDE AND ACETAMINOPHEN 5; 325 MG/1; MG/1
1 TABLET ORAL ONCE
Status: COMPLETED | OUTPATIENT
Start: 2023-04-19 | End: 2023-04-19

## 2023-04-19 RX ORDER — OXYCODONE HYDROCHLORIDE AND ACETAMINOPHEN 5; 325 MG/1; MG/1
1 TABLET ORAL EVERY 4 HOURS PRN
Qty: 16 TABLET | Refills: 0 | Status: SHIPPED | OUTPATIENT
Start: 2023-04-19 | End: 2023-04-23

## 2023-04-19 RX ORDER — BUPIVACAINE HYDROCHLORIDE AND EPINEPHRINE 5; 5 MG/ML; UG/ML
1.8 INJECTION, SOLUTION EPIDURAL; INTRACAUDAL; PERINEURAL ONCE
Status: COMPLETED | OUTPATIENT
Start: 2023-04-19 | End: 2023-04-19

## 2023-04-19 RX ORDER — CLINDAMYCIN HYDROCHLORIDE 300 MG/1
300 CAPSULE ORAL EVERY 8 HOURS SCHEDULED
Qty: 15 CAPSULE | Refills: 0 | Status: SHIPPED | OUTPATIENT
Start: 2023-04-19 | End: 2023-04-24

## 2023-04-19 RX ADMIN — TOPICAL ANESTHETIC 1 SPRAY: 200 SPRAY DENTAL; PERIODONTAL at 20:34

## 2023-04-19 RX ADMIN — BUPIVACAINE HYDROCHLORIDE AND EPINEPHRINE BITARTRATE 1.8 ML: 5; .005 INJECTION, SOLUTION SUBCUTANEOUS at 20:34

## 2023-04-19 RX ADMIN — OXYCODONE HYDROCHLORIDE AND ACETAMINOPHEN 1 TABLET: 5; 325 TABLET ORAL at 20:34

## 2023-04-19 NOTE — Clinical Note
Corey Gilbert was seen and treated in our emergency department on 4/19/2023  Diagnosis:     Corey    He may return on this date: 04/22/2023         If you have any questions or concerns, please don't hesitate to call        Liseth Worthy DO    ______________________________           _______________          _______________  Hospital Representative                              Date                                Time

## 2023-04-20 NOTE — ED PROVIDER NOTES
"History  Chief Complaint   Patient presents with   • Dental Pain     Pt has an appt  With a dentist \"but not susan Alessandranelia Bonillaleah"      33year old male, here with ongoing right lower dental pain  Hx of poor dentition, HIV (well controlled)  Pain worsening in remaining right lower teeth, knows tooth is eroded and needs to be removed  Awaiting orthodontist visit to have multiple teeth pulled  No fevers, no airway compromise  Has been going to local clinic, attempting local nerve blocks but not helping with pain  Prior to Admission Medications   Prescriptions Last Dose Informant Patient Reported? Taking? Biktarvy -25 MG tablet   Yes No   Sig: Take 1 tablet by mouth daily   albuterol (PROVENTIL HFA,VENTOLIN HFA) 90 mcg/act inhaler   No No   Sig: Inhale 2 puffs every 4 (four) hours as needed for wheezing   benzocaine (ORAJEL) 10 % mucosal gel   No No   Sig: Apply 1 application to the mouth or throat 3 (three) times a day as needed for mucositis   capsaicin (ZOSTRIX) 0 025 % cream   No No   Sig: Apply 976 application topically 3 (three) times a day   Patient not taking: Reported on 3/20/2023   dicyclomine (BENTYL) 20 mg tablet   No No   Sig: Take 1 tablet (20 mg total) by mouth 2 (two) times a day   Patient not taking: Reported on 3/20/2023   fluticasone (Flovent HFA) 44 mcg/act inhaler   No No   Sig: Inhale 2 puffs 2 (two) times a day Rinse mouth after use     ondansetron (Zofran ODT) 4 mg disintegrating tablet   No No   Sig: Take 1 tablet (4 mg total) by mouth every 6 (six) hours as needed for nausea or vomiting   Patient not taking: Reported on 3/20/2023      Facility-Administered Medications: None       Past Medical History:   Diagnosis Date   • Closed displaced bicondylar fracture of right tibia 5/6/2021   • Hypertension        Past Surgical History:   Procedure Laterality Date   • HEMORROIDECTOMY     • LASER ABLATION OF CONDYLOMAS N/A 2/22/2023    Procedure: EXCISION CONDYLOMA PERINEAL (PENILE/VAGINAL) W/LASER " "CO2;  Surgeon: Nichol Ramos MD;  Location: [facility] OR;  Service: Urology   • PATELLA FRACTURE SURGERY Right        No family history on file  I have reviewed and agree with the history as documented  E-Cigarette/Vaping   • E-Cigarette Use Never User      E-Cigarette/Vaping Substances   • Nicotine No    • THC No    • CBD No    • Flavoring No    • Other No    • Unknown No      Social History     Tobacco Use   • Smoking status: Every Day     Packs/day: 0 25     Types: Cigarettes     Passive exposure: Current   • Smokeless tobacco: Never   Vaping Use   • Vaping Use: Never used   Substance Use Topics   • Alcohol use: Not Currently   • Drug use: Yes     Types: Marijuana     Comment: daily - \"alot\"       Review of Systems   Constitutional: Negative  Negative for chills and fever  HENT: Positive for dental problem  Negative for drooling, facial swelling, hearing loss, rhinorrhea, sore throat, trouble swallowing and voice change  Eyes: Negative  Negative for pain and visual disturbance  Respiratory: Negative  Negative for cough, shortness of breath and wheezing  Cardiovascular: Negative  Negative for chest pain and palpitations  Gastrointestinal: Negative for abdominal pain, diarrhea, nausea and vomiting  Genitourinary: Negative  Negative for dysuria and frequency  Musculoskeletal: Negative  Negative for neck pain and neck stiffness  Skin: Negative  Negative for rash  Neurological: Negative  Negative for dizziness, speech difficulty, weakness, light-headedness and numbness  Physical Exam  Physical Exam  Vitals and nursing note reviewed  Constitutional:       General: He is not in acute distress  Appearance: He is well-developed  HENT:      Head: Normocephalic and atraumatic  Mouth/Throat:      Lips: Pink  No lesions  Mouth: Mucous membranes are moist       Dentition: Abnormal dentition  Does not have dentures  Dental tenderness and dental caries present   No gingival " swelling, dental abscesses or gum lesions  Tongue: No lesions  Palate: No mass  Pharynx: Oropharynx is clear  Uvula midline  No pharyngeal swelling or oropharyngeal exudate  Tonsils: No tonsillar exudate or tonsillar abscesses  1+ on the right  1+ on the left  Eyes:      Conjunctiva/sclera: Conjunctivae normal       Pupils: Pupils are equal, round, and reactive to light  Neck:      Trachea: No tracheal deviation  Cardiovascular:      Rate and Rhythm: Normal rate and regular rhythm  Pulmonary:      Effort: Pulmonary effort is normal  No respiratory distress  Breath sounds: Normal breath sounds  No wheezing or rales  Abdominal:      General: Bowel sounds are normal  There is no distension  Palpations: Abdomen is soft  Tenderness: There is no abdominal tenderness  There is no guarding or rebound  Musculoskeletal:         General: No tenderness or deformity  Normal range of motion  Cervical back: Normal range of motion and neck supple  Skin:     General: Skin is warm and dry  Capillary Refill: Capillary refill takes less than 2 seconds  Findings: No rash  Neurological:      Mental Status: He is alert and oriented to person, place, and time     Psychiatric:         Behavior: Behavior normal          Vital Signs  ED Triage Vitals [04/19/23 1942]   Temperature Pulse Respirations Blood Pressure SpO2   98 °F (36 7 °C) 77 18 104/57 --      Temp Source Heart Rate Source Patient Position - Orthostatic VS BP Location FiO2 (%)   Tympanic Monitor Sitting Left arm --      Pain Score       --           Vitals:    04/19/23 1942   BP: 104/57   Pulse: 77   Patient Position - Orthostatic VS: Sitting         Visual Acuity      ED Medications  Medications   oxyCODONE-acetaminophen (PERCOCET) 5-325 mg per tablet 1 tablet (1 tablet Oral Given 4/19/23 2034)   bupivacaine-epinephrine (PF) (MARCAINE/EPINEPHRINE PF) 0 5 %-1:808995 injection 1 8 mL (1 8 mL Injection Given 4/19/23 "2034)   benzocaine (HURRICAINE) 20 % mucosal spray 1 spray (1 spray Mucosal Given 4/19/23 2034)       Diagnostic Studies  Results Reviewed     None                 No orders to display              Procedures  Nerve block    Date/Time: 4/20/2023 8:39 AM  Performed by: Dianne Strnage DO  Authorized by: Dianne Strange DO     Patient location:  ED  [place] Protocol:  Risks and benefits: risks, benefits and alternatives were discussed  Consent given by: patient  Time out: Immediately prior to procedure a \"time out\" was called to verify the correct patient, procedure, equipment, support staff and site/side marked as required  Patient understanding: patient states understanding of the procedure being performed  Relevant documents: relevant documents present and verified  Test results: test results available and properly labeled  Radiology Images displayed and confirmed  If images not available, report reviewed: imaging studies available  Required items: required blood products, implants, devices, and special equipment available  Patient identity confirmed: verbally with patient      Indications:     Indications:  Pain relief  Location:     Body area:  Head    Head nerve blocked: Inferior alveolor nerve  Laterality:  Right  Skin anesthesia (see MAR for exact dosages):     Skin anesthesia method:  Local infiltration    Local anesthetic:  Bupivacaine 0 5% w/o epi  Procedure details (see MAR for exact dosages): Block needle gauge:  25 G    Anesthetic injected:  Bupivacaine 0 5% w/o epi    Steroid injected:  None    Additive injected:  None    Injection procedure:  Anatomic landmarks identified, incremental injection, negative aspiration for blood, anatomic landmarks palpated and introduced needle  Post-procedure details:     Dressing:  None    Outcome:  Anesthesia achieved    Patient tolerance of procedure:   Tolerated well, no immediate complications             ED Course   " Medical Decision Making  33year old male, severe poor dentition  Pain controlled with dental block as noted  No evidence of infection  No airway compromise or evidence of ludwigs angina  No advance imaging indicated at this time  PMDP reviewed, consistent with patient history of ongoing dental problems needing opiod pain medication management  Reviewed temporary nature of dental block, risk of ongoing opioid use, follow up with pcp and dentist  Patient states he is calling dentist daily for an cancelled appointments but also has persona appointment in June  Risk  OTC drugs  Prescription drug management  Disposition  Final diagnoses:   Pain, dental   Dental caries     Time reflects when diagnosis was documented in both MDM as applicable and the Disposition within this note     Time User Action Codes Description Comment    4/19/2023  8:30 PM Bonner Hurst Add [K08 89] Pain, dental     4/19/2023  8:30 PM Bonner Hurst Add [K02 9] Dental caries       ED Disposition     ED Disposition   Discharge    Condition   Stable    Date/Time   Wed Apr 19, 2023  8:30 PM    [facility]self care                 Follow-up Information     Follow up With Specialties Details Why Contact Info Additional [address] In 1 week  [address], [address] [facility] [address]  [address], [address], [address] [place], [place], [address]    [facility]  Schedule an appointment as soon as possible for a visit   [address] [place] [address]  [phone]           Discharge Medication List as of 4/19/2023  8:35 PM      START taking these medications    Details   clindamycin (CLEOCIN) 300 MG capsule Take 1 capsule (300 mg total) by mouth every 8 (eight) hours for 5 days, Starting Wed 4/19/2023, Until Mon 4/24/2023, Normal      oxyCODONE-acetaminophen (Percocet) 5-325 mg per tablet Take 1 tablet by mouth every 4 (four) hours as needed for moderate pain for up to 4 days Max Daily Amount: 6 tablets, Starting Wed 4/19/2023, Until Sun 4/23/2023 at 2359, Normal         CONTINUE these medications which have NOT CHANGED    Details   albuterol (PROVENTIL HFA,VENTOLIN HFA) 90 mcg/act inhaler Inhale 2 puffs every 4 (four) hours as needed for wheezing, Starting Tue 12/27/2022, Normal      benzocaine (ORAJEL) 10 % mucosal gel Apply 1 application to the mouth or throat 3 (three) times a day as needed for mucositis, Starting Wed 1/11/2023, Normal      Biktarvy -25 MG tablet Take 1 tablet by mouth daily, Starting Fri 12/2/2022, Historical Med      capsaicin (ZOSTRIX) 0 025 % cream Apply 948 application topically 3 (three) times a day, Starting Tue 12/27/2022, Normal      dicyclomine (BENTYL) 20 mg tablet Take 1 tablet (20 mg total) by mouth 2 (two) times a day, Starting Mon 3/13/2023, Normal      fluticasone (Flovent HFA) 44 mcg/act inhaler Inhale 2 puffs 2 (two) times a day Rinse mouth after use , Starting Tue 12/27/2022, Normal      ondansetron (Zofran ODT) 4 mg disintegrating tablet Take 1 tablet (4 mg total) by mouth every 6 (six) hours as needed for nausea or vomiting, Starting Mon 3/13/2023, Normal             No discharge procedures on file      PDMP Review       Value Time User    PDMP Reviewed  Yes 2/15/2023  9:28 AM Caity Marquis [facility] Provider  Electronically Signed by           Dianne Strange DO  04/20/23 [id number]

## 2024-07-11 ENCOUNTER — TELEPHONE (OUTPATIENT)
Age: 34
End: 2024-07-11

## 2024-07-25 NOTE — PROGRESS NOTES
7/26/2024    Chief Complaint   Patient presents with   • Penile Warts     Assessment and Plan    33 y.o. male managed by Dr. Hernandez     1. Penile wart  Assessment & Plan:  Previously had excision condyloma perineal w/ CO2 laser 2/2023 by Dr. Hernandez   Discussed topical medication application  Patient is not interested in medication and would like to repeat surgical procedure   1 cm condyloma on the right side of the penis shaft   Small mm sized condylomas also noted on the right side   Patient reports constant pain and occasional oozing from the 1 cm condyloma   OR case request sent   Consent form signed- risks discussed   Orders:  -     Case request operating room: EXCISION CONDYLOMA PERINEAL (PENILE/VAGINAL) WITH LASER CO2; Standing  -     Case request operating room: EXCISION CONDYLOMA PERINEAL (PENILE/VAGINAL) WITH LASER CO2  2. Asymptomatic HIV infection, with no history of HIV-related illness (HCC)  Assessment & Plan:  Continues on biktarvy daily   Managed by [facility] adult [facility] services   Reports undetectable viral load  Unable to obtain labs to confirm       History of Present Illness  Corey Gilbert is a 33 y.o. male here for follow up of treatment for numerous condyloma on penile skin from 2/2023. 1 large lesion was excised and sutured closed. The rest were lasered. Pathology showed- Low-grade squamous intraepithelial lesion (condyloma acuminatum), present at examined tissue edges. Negative for high-grade squamous intraepithelial lesion and malignancy. 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 Patient is HIV positive and takes biktarvy for viral load suppression. Per patient viral load is undetectable.   Patient follows with [facility] adult [facility] services for his HIV management.  Patient reports that a few months after the procedure that the condyloma grew back.  He reports 1 large wart that is painful, cracked and oozes at times.  He reports not being interested in medication for management.  He would like this  "surgically removed as previously done before.    Review of Systems   Constitutional:  Negative for chills and fever.   HENT:  Negative for ear pain and sore throat.    Eyes:  Negative for pain and visual disturbance.   Respiratory:  Negative for cough and shortness of breath.    Cardiovascular:  Negative for chest pain and palpitations.   Gastrointestinal:  Negative for abdominal pain and vomiting.   Genitourinary:  Positive for penile pain. Negative for decreased urine volume, difficulty urinating, dysuria, hematuria, penile discharge and urgency.   Musculoskeletal:  Negative for arthralgias and back pain.   Skin:  Negative for color change and rash.   Neurological:  Negative for seizures and syncope.   All other systems reviewed and are negative.    AUA SYMPTOM SCORE      Flowsheet Row Most Recent Value   AUA SYMPTOM SCORE    How often have you had a sensation of not emptying your bladder completely after you finished urinating? 0 (P)     How often have you had to urinate again less than two hours after you finished urinating? 0 (P)     How often have you found you stopped and started again several times when you urinate? 0 (P)     How often have you found it difficult to postpone urination? 0 (P)     How often have you had a weak urinary stream? 0 (P)     How often have you had to push or strain to begin urination? 0 (P)     How many times did you most typically get up to urinate from the time you went to bed at night until the time you got up in the morning? 0 (P)     Quality of Life: If you were to spend the rest of your life with your urinary condition just the way it is now, how would you feel about that? 0 (P)     AUA SYMPTOM SCORE 0 (P)               Vitals  Vitals:    07/26/24 1417   BP: 130/70   BP Location: Left arm   Patient Position: Sitting   Cuff Size: Standard   Pulse: 74   SpO2: 97%   Weight: 93.4 kg (206 lb)   Height: 5' 9\" (1.753 m)       Physical Exam  Vitals reviewed.   Constitutional:       " "Appearance: Normal appearance.   HENT:      Head: Normocephalic and atraumatic.   Eyes:      Conjunctiva/sclera: Conjunctivae normal.   Pulmonary:      Effort: Pulmonary effort is normal.   Abdominal:      General: Abdomen is flat. There is no distension.      Palpations: Abdomen is soft.      Tenderness: There is no abdominal tenderness.   Genitourinary:     Comments: Approximately 1 cm condyloma noted on the right penile shaft.  Appears cracked, no discharge noted.  Additionally, there are scattered small condylomas noted above the 1 cm condyloma.   Musculoskeletal:         General: Normal range of motion.      Cervical back: Normal range of motion.   Skin:     General: Skin is warm and dry.   Neurological:      General: No focal deficit present.      Mental Status: He is alert and oriented to person, place, and time.   Psychiatric:         Mood and Affect: Mood normal.         Behavior: Behavior normal.         Thought Content: Thought content normal.         Judgment: Judgment normal.       Past History  Past Medical History:   Diagnosis Date   • Closed displaced bicondylar fracture of right tibia 5/6/2021   • Hypertension      Social History     Socioeconomic History   • Marital status: Single     Spouse name: None   • Number of children: None   • Years of education: None   • Highest education level: None   Occupational History   • None   Tobacco Use   • Smoking status: Every Day     Current packs/day: 0.25     Types: Cigarettes     Passive exposure: Current   • Smokeless tobacco: Never   Vaping Use   • Vaping status: Never Used   Substance and Sexual Activity   • Alcohol use: Not Currently   • Drug use: Yes     Types: Marijuana     Comment: daily - \"alot\"   • Sexual activity: None   Other Topics Concern   • None   Social History Narrative   • None     Social Determinants of Health     Financial Resource Strain: Low Risk  (2/15/2023)    Overall Financial Resource Strain (CARDIA)    • Difficulty of Paying Living " "Expenses: Not very hard   Food Insecurity: No Food Insecurity (2/15/2023)    Hunger Vital Sign    • Worried About Running Out of Food in the Last Year: Never true    • Ran Out of Food in the Last Year: Never true   Transportation Needs: No Transportation Needs (2/15/2023)    PRAPARE - Transportation    • Lack of Transportation (Medical): No    • Lack of Transportation (Non-Medical): No   Physical Activity: Not on file   Stress: Not on file   Social Connections: Not on file   Intimate Partner Violence: Not on file   Housing Stability: Not on file     Social History     Tobacco Use   Smoking Status Every Day   • Current packs/day: 0.25   • Types: Cigarettes   • Passive exposure: Current   Smokeless Tobacco Never     No family history on file.    The following portions of the patient's history were reviewed and updated as appropriate: allergies, current medications, past medical history, past social history, past surgical history and problem list.    Results  No results found for this or any previous visit (from the past 1 hour(s)).]  No results found for: \"PSA\"  Lab Results   Component Value Date    CALCIUM 9.5 03/13/2023    K 4.3 03/13/2023    CO2 22 03/13/2023     03/13/2023    BUN 9 03/13/2023    CREATININE 0.86 03/13/2023     Lab Results   Component Value Date    WBC 18.55 (H) 03/13/2023    HGB 16.9 03/13/2023    HCT 51.6 (H) 03/13/2023    MCV 96 03/13/2023     03/13/2023         "

## 2024-07-26 ENCOUNTER — OFFICE VISIT (OUTPATIENT)
Dept: UROLOGY | Facility: MEDICAL CENTER | Age: 34
End: 2024-07-26
Payer: COMMERCIAL

## 2024-07-26 VITALS
HEART RATE: 74 BPM | HEIGHT: 69 IN | OXYGEN SATURATION: 97 % | BODY MASS INDEX: 30.51 KG/M2 | SYSTOLIC BLOOD PRESSURE: 130 MMHG | DIASTOLIC BLOOD PRESSURE: 70 MMHG | WEIGHT: 206 LBS

## 2024-07-26 DIAGNOSIS — Z21 ASYMPTOMATIC HIV INFECTION, WITH NO HISTORY OF HIV-RELATED ILLNESS (HCC): ICD-10-CM

## 2024-07-26 DIAGNOSIS — A63.0 PENILE WART: Primary | ICD-10-CM

## 2024-07-26 PROCEDURE — 99213 OFFICE O/P EST LOW 20 MIN: CPT

## 2024-07-26 RX ORDER — AMLODIPINE BESYLATE 2.5 MG/1
2.5 TABLET ORAL DAILY
COMMUNITY
Start: 2024-06-03

## 2024-07-26 RX ORDER — IMIQUIMOD 12.5 MG/.25G
1 CREAM TOPICAL 3 TIMES WEEKLY
Qty: 12 EACH | Refills: 0 | Status: CANCELLED | OUTPATIENT
Start: 2024-07-26

## 2024-07-26 NOTE — ASSESSMENT & PLAN NOTE
Continues on biktarvy daily   Managed by [facility] adult care services   Reports undetectable viral load  Unable to obtain labs to confirm

## 2024-07-26 NOTE — ASSESSMENT & PLAN NOTE
Previously had excision condyloma perineal w/ CO2 laser 2/2023 by Dr. Hernandez   Discussed topical medication application  Patient is not interested in medication and would like to repeat surgical procedure   1 cm condyloma on the right side of the penis shaft   Small mm sized condylomas also noted on the right side   Patient reports constant pain and occasional oozing from the 1 cm condyloma   OR case request sent   Consent form signed- risks discussed

## 2024-07-29 ENCOUNTER — TELEPHONE (OUTPATIENT)
Dept: UROLOGY | Facility: MEDICAL CENTER | Age: 34
End: 2024-07-29

## 2024-07-30 ENCOUNTER — PREP FOR PROCEDURE (OUTPATIENT)
Dept: UROLOGY | Facility: MEDICAL CENTER | Age: 34
End: 2024-07-30

## 2024-07-30 DIAGNOSIS — R39.89 SUSPECTED UTI: ICD-10-CM

## 2024-07-30 DIAGNOSIS — Z01.812 PRE-OPERATIVE LABORATORY EXAMINATION: Primary | ICD-10-CM

## 2024-07-30 DIAGNOSIS — A63.0 PENILE WART: ICD-10-CM

## 2024-07-30 NOTE — TELEPHONE ENCOUNTER
Spoke with patient and confirmed surgery date of: 8/13  Type of surgery: Ex. Condyloma  Operating physician: Dr. Hernandez  Location of surgery: [facility]    Verbally went over prep with patient on: 7/30  NPO  Bowel prep? No  Hospital calls afternoon prior with arrival time -Calls Friday afternoon for Monday surgeries  Patient needs ride to and from surgery (outpatient)   Pre-op testing to be done 2 weeks prior to surgery  [facility] - he is going this week   Blood thinners:   No major -PAT will call to go over meds   Clearances needed: None    Emailed to patient on: 7/30  Copy of packet scanned into [facility]  Labs in packet  Soap prep in packet  Post-op NOT in packet - no openings sending to clinical  Condyloma Excision: 2 weeks with AP    Consent: in Media

## 2024-08-02 NOTE — PRE-PROCEDURE INSTRUCTIONS
Pre-Surgery Instructions:   Medication Instructions    albuterol (PROVENTIL HFA,VENTOLIN HFA) 90 mcg/act inhaler Uses PRN- OK to take day of surgery    amLODIPine (NORVASC) 2.5 mg tablet Take day of surgery.    Biktarvy -25 MG tablet Take day of surgery.   Medication instructions for day surgery reviewed. Please use only a sip of water to take your instructed medications. Avoid all over the counter vitamins, supplements and NSAIDS for one week prior to surgery per anesthesia guidelines. Tylenol is ok to take as needed.     You will receive a call one business day prior to surgery with an arrival time and hospital directions. If your surgery is scheduled on a Monday, the hospital will be calling you on the Friday prior to your surgery. If you have not heard from anyone by 8pm, please call the hospital supervisor through the hospital  at [phone]. ([place] [phone] or [place] [phone]).    Do not eat or drink anything after midnight the night before your surgery, including candy, mints, lifesavers, or chewing gum. Do not drink alcohol 24hrs before your surgery. Try not to smoke at least 24hrs before your surgery.       Follow the pre surgery showering instructions as listed in the “My Surgical Experience Booklet” or otherwise provided by your surgeon's office. Do not use a blade to shave the surgical area 1 week before surgery. It is okay to use a clean electric clippers up to 24 hours before surgery. Do not apply any lotions, creams, including makeup, cologne, deodorant, or perfumes after showering on the day of your surgery. Do not use dry shampoo, hair spray, hair gel, or any type of hair products.     No contact lenses, eye make-up, or artificial eyelashes. Remove nail polish, including gel polish, and any artificial, gel, or acrylic nails if possible. Remove all jewelry including rings and body piercing jewelry.     Wear causal clothing that is easy to take on and off. Consider your  type of surgery.    Keep any valuables, jewelry, piercings at home. Please bring any specially ordered equipment (sling, braces) if indicated.    Arrange for a responsible person to drive you to and from the hospital on the day of your surgery. Please confirm the visitor policy for the day of your procedure when you receive your phone call with an arrival time.     Call the surgeon's office with any new illnesses, exposures, or additional questions prior to surgery.    Please reference your “My Surgical Experience Booklet” for additional information to prepare for your upcoming surgery.

## 2024-08-05 NOTE — TELEPHONE ENCOUNTER
Called the patient to let him know his appt time and date.  Not able to leave a message, just said he was not available.    Called patient's contact, his sister, Celebrity Gilbert.  Left message on her voice mail that his postop appt was scheduled for 8/30/24 at 8 AM with Sandra.  Asked that he call back and confirm the appt at [phone].

## 2024-08-07 ENCOUNTER — APPOINTMENT (OUTPATIENT)
Dept: LAB | Facility: HOSPITAL | Age: 34
End: 2024-08-07
Payer: COMMERCIAL

## 2024-08-07 DIAGNOSIS — A63.0 PENILE WART: ICD-10-CM

## 2024-08-07 DIAGNOSIS — R39.89 SUSPECTED UTI: ICD-10-CM

## 2024-08-07 DIAGNOSIS — Z01.812 PRE-OPERATIVE LABORATORY EXAMINATION: ICD-10-CM

## 2024-08-07 LAB
BACTERIA UR QL AUTO: ABNORMAL /HPF
BILIRUB UR QL STRIP: NEGATIVE
CLARITY UR: CLEAR
COLOR UR: YELLOW
GLUCOSE UR STRIP-MCNC: NEGATIVE MG/DL
HGB UR QL STRIP.AUTO: ABNORMAL
KETONES UR STRIP-MCNC: NEGATIVE MG/DL
LEUKOCYTE ESTERASE UR QL STRIP: NEGATIVE
MUCOUS THREADS UR QL AUTO: ABNORMAL
NITRITE UR QL STRIP: NEGATIVE
NON-SQ EPI CELLS URNS QL MICRO: ABNORMAL /HPF
PH UR STRIP.AUTO: 6 [PH]
PROT UR STRIP-MCNC: ABNORMAL MG/DL
RBC #/AREA URNS AUTO: ABNORMAL /HPF
SP GR UR STRIP.AUTO: 1.03 (ref 1–1.03)
UROBILINOGEN UR STRIP-ACNC: <2 MG/DL
WBC #/AREA URNS AUTO: ABNORMAL /HPF

## 2024-08-07 PROCEDURE — 87086 URINE CULTURE/COLONY COUNT: CPT

## 2024-08-07 PROCEDURE — 81001 URINALYSIS AUTO W/SCOPE: CPT

## 2024-08-09 LAB — BACTERIA UR CULT: NORMAL

## 2024-08-12 ENCOUNTER — ANESTHESIA EVENT (OUTPATIENT)
Dept: PERIOP | Facility: HOSPITAL | Age: 34
End: 2024-08-12
Payer: COMMERCIAL

## 2024-08-12 PROCEDURE — NC001 PR NO CHARGE: Performed by: UROLOGY

## 2024-08-12 RX ORDER — VANCOMYCIN HYDROCHLORIDE 1 G/200ML
1000 INJECTION, SOLUTION INTRAVENOUS ONCE
Status: DISCONTINUED | OUTPATIENT
Start: 2024-08-12 | End: 2024-08-12

## 2024-08-12 NOTE — H&P
HISTORY AND PHYSICAL  ?  ?  Patient Name: Corey Gilbert  Patient MRN: [id number]  Attending Provider: Stephen Hernandez MD  Service: Urology  Chief Complaint    Penile condyloma    HPI   Corey Gilbert is a 33 y.o. male with recurrent condyloma, recent exam shows:    1 cm condyloma on the right side of the penis shaft   Small mm sized condylomas also noted on the right side   Patient reports constant pain and occasional oozing from the 1 cm condyloma     I plan CO2 laser treatment of condyloma  Potential risks and complications discussed, and informed consent was given by the patient.  Medications  Meds/Allergies   No current facility-administered medications for this encounter.      Cannot display prior to admission medications because the patient has not been admitted in this contact.     No current facility-administered medications for this encounter.    Current Outpatient Medications:     albuterol (PROVENTIL HFA,VENTOLIN HFA) 90 mcg/act inhaler, Inhale 2 puffs every 4 (four) hours as needed for wheezing, Disp: 18 g, Rfl: 3    amLODIPine (NORVASC) 2.5 mg tablet, Take 2.5 mg by mouth daily, Disp: , Rfl:     Biktarvy -25 MG tablet, Take 1 tablet by mouth daily, Disp: , Rfl:     benzocaine (ORAJEL) 10 % mucosal gel, Apply 1 application to the mouth or throat 3 (three) times a day as needed for mucositis (Patient not taking: Reported on 8/2/2024), Disp: 5.3 g, Rfl: 0    fluticasone (Flovent HFA) 44 mcg/act inhaler, Inhale 2 puffs 2 (two) times a day Rinse mouth after use. (Patient not taking: Reported on 8/2/2024), Disp: 10.6 g, Rfl: 3  Review of Systems  10 point review of systems negative except as noted in HPI  Allergies  Allergies   Allergen Reactions    Other Anaphylaxis     ALL SEAFOOD    Red Dye - Food Allergy Shortness Of Breath    Amoxicillin Swelling     PMH  Past Medical History:   Diagnosis Date    Closed displaced bicondylar fracture of right tibia 05/06/2021    Hypertension      Past  "surgical history  Past Surgical History:   Procedure Laterality Date    HEMORROIDECTOMY      LASER ABLATION OF CONDYLOMAS N/A 2/22/2023    Procedure: EXCISION CONDYLOMA PERINEAL (PENILE/VAGINAL) W/LASER CO2;  Surgeon: Stephen Hernandez MD;  Location: [facility] OR;  Service: Urology    PATELLA FRACTURE SURGERY Right      Social history  Social History     Tobacco Use    Smoking status: Every Day     Current packs/day: 0.25     Types: Cigarettes     Passive exposure: Current    Smokeless tobacco: Never   Vaping Use    Vaping status: Never Used   Substance Use Topics    Alcohol use: Not Currently    Drug use: Yes     Types: Marijuana     Comment: daily - \"alot\"     ?  Physical Exam    .vs  General appearance: alert and oriented, in no acute distress  Head: Normocephalic, without obvious abnormality, atraumatic  Throat: lips, mucosa, and tongue normal; teeth and gums normal  Neck: no adenopathy, no carotid bruit, no JVD, supple, symmetrical, trachea midline, and thyroid not enlarged, symmetric, no tenderness/mass/nodules  Lungs: clear to auscultation bilaterally  Heart: regular rate and rhythm, S1, S2 normal, no murmur, click, rub or gallop  Abdomen: soft, non-tender; bowel sounds normal; no masses,  no organomegaly  Extremities: extremities normal, warm and well-perfused; no cyanosis, clubbing, or edema  Stephen Hernandez MD   "

## 2024-08-13 ENCOUNTER — HOSPITAL ENCOUNTER (OUTPATIENT)
Facility: HOSPITAL | Age: 34
Setting detail: OUTPATIENT SURGERY
Discharge: HOME | End: 2024-08-13
Attending: UROLOGY | Admitting: UROLOGY
Payer: COMMERCIAL

## 2024-08-13 ENCOUNTER — ANESTHESIA (OUTPATIENT)
Dept: PERIOP | Facility: HOSPITAL | Age: 34
End: 2024-08-13
Payer: COMMERCIAL

## 2024-08-13 VITALS
SYSTOLIC BLOOD PRESSURE: 130 MMHG | TEMPERATURE: 97.7 F | HEIGHT: 69 IN | OXYGEN SATURATION: 97 % | BODY MASS INDEX: 30.17 KG/M2 | HEART RATE: 68 BPM | DIASTOLIC BLOOD PRESSURE: 81 MMHG | WEIGHT: 203.71 LBS | RESPIRATION RATE: 16 BRPM

## 2024-08-13 DIAGNOSIS — A63.0 PENILE WART: ICD-10-CM

## 2024-08-13 DIAGNOSIS — B02.29 POSTHERPETIC NEURALGIA: Primary | ICD-10-CM

## 2024-08-13 DIAGNOSIS — A63.0 ANOGENITAL WARTS: ICD-10-CM

## 2024-08-13 PROCEDURE — 88305 TISSUE EXAM BY PATHOLOGIST: CPT | Performed by: PATHOLOGY

## 2024-08-13 PROCEDURE — 99024 POSTOP FOLLOW-UP VISIT: CPT | Performed by: UROLOGY

## 2024-08-13 PROCEDURE — 54065 DESTRUCTION PENIS LESION(S): CPT | Performed by: UROLOGY

## 2024-08-13 RX ORDER — HYDROMORPHONE HCL/PF 1 MG/ML
0.5 SYRINGE (ML) INJECTION
Status: DISCONTINUED | OUTPATIENT
Start: 2024-08-13 | End: 2024-08-13 | Stop reason: HOSPADM

## 2024-08-13 RX ORDER — HYDROCODONE BITARTRATE AND ACETAMINOPHEN 5; 325 MG/1; MG/1
1 TABLET ORAL EVERY 6 HOURS PRN
Qty: 4 TABLET | Refills: 0 | Status: SHIPPED | OUTPATIENT
Start: 2024-08-13 | End: 2024-08-23

## 2024-08-13 RX ORDER — CIPROFLOXACIN 2 MG/ML
400 INJECTION, SOLUTION INTRAVENOUS ONCE
Status: COMPLETED | OUTPATIENT
Start: 2024-08-13 | End: 2024-08-13

## 2024-08-13 RX ORDER — ONDANSETRON 2 MG/ML
INJECTION INTRAMUSCULAR; INTRAVENOUS AS NEEDED
Status: DISCONTINUED | OUTPATIENT
Start: 2024-08-13 | End: 2024-08-13

## 2024-08-13 RX ORDER — LIDOCAINE HYDROCHLORIDE 20 MG/ML
INJECTION, SOLUTION EPIDURAL; INFILTRATION; INTRACAUDAL; PERINEURAL AS NEEDED
Status: DISCONTINUED | OUTPATIENT
Start: 2024-08-13 | End: 2024-08-13

## 2024-08-13 RX ORDER — PROPOFOL 10 MG/ML
INJECTION, EMULSION INTRAVENOUS AS NEEDED
Status: DISCONTINUED | OUTPATIENT
Start: 2024-08-13 | End: 2024-08-13

## 2024-08-13 RX ORDER — PROMETHAZINE HYDROCHLORIDE 25 MG/ML
12.5 INJECTION, SOLUTION INTRAMUSCULAR; INTRAVENOUS ONCE AS NEEDED
Status: DISCONTINUED | OUTPATIENT
Start: 2024-08-13 | End: 2024-08-13 | Stop reason: HOSPADM

## 2024-08-13 RX ORDER — MIDAZOLAM HYDROCHLORIDE 2 MG/2ML
INJECTION, SOLUTION INTRAMUSCULAR; INTRAVENOUS AS NEEDED
Status: DISCONTINUED | OUTPATIENT
Start: 2024-08-13 | End: 2024-08-13

## 2024-08-13 RX ORDER — BUPIVACAINE HYDROCHLORIDE 5 MG/ML
INJECTION, SOLUTION EPIDURAL; INTRACAUDAL AS NEEDED
Status: DISCONTINUED | OUTPATIENT
Start: 2024-08-13 | End: 2024-08-13 | Stop reason: HOSPADM

## 2024-08-13 RX ORDER — FENTANYL CITRATE/PF 50 MCG/ML
25 SYRINGE (ML) INJECTION
Status: DISCONTINUED | OUTPATIENT
Start: 2024-08-13 | End: 2024-08-13 | Stop reason: HOSPADM

## 2024-08-13 RX ORDER — OXYCODONE AND ACETAMINOPHEN 5; 325 MG/1; MG/1
1 TABLET ORAL EVERY 8 HOURS PRN
Qty: 4 TABLET | Refills: 0 | Status: SHIPPED | OUTPATIENT
Start: 2024-08-13 | End: 2024-08-15 | Stop reason: SDUPTHER

## 2024-08-13 RX ORDER — KETAMINE HCL IN NACL, ISO-OSM 100MG/10ML
SYRINGE (ML) INJECTION AS NEEDED
Status: DISCONTINUED | OUTPATIENT
Start: 2024-08-13 | End: 2024-08-13

## 2024-08-13 RX ORDER — ONDANSETRON 2 MG/ML
4 INJECTION INTRAMUSCULAR; INTRAVENOUS ONCE AS NEEDED
Status: DISCONTINUED | OUTPATIENT
Start: 2024-08-13 | End: 2024-08-13 | Stop reason: HOSPADM

## 2024-08-13 RX ORDER — OXYCODONE AND ACETAMINOPHEN 5; 325 MG/1; MG/1
1 TABLET ORAL EVERY 4 HOURS PRN
Status: DISCONTINUED | OUTPATIENT
Start: 2024-08-13 | End: 2024-08-13 | Stop reason: HOSPADM

## 2024-08-13 RX ORDER — DEXAMETHASONE SODIUM PHOSPHATE 10 MG/ML
INJECTION, SOLUTION INTRAMUSCULAR; INTRAVENOUS AS NEEDED
Status: DISCONTINUED | OUTPATIENT
Start: 2024-08-13 | End: 2024-08-13

## 2024-08-13 RX ORDER — FENTANYL CITRATE 50 UG/ML
INJECTION, SOLUTION INTRAMUSCULAR; INTRAVENOUS AS NEEDED
Status: DISCONTINUED | OUTPATIENT
Start: 2024-08-13 | End: 2024-08-13

## 2024-08-13 RX ORDER — SODIUM CHLORIDE, SODIUM LACTATE, POTASSIUM CHLORIDE, CALCIUM CHLORIDE 600; 310; 30; 20 MG/100ML; MG/100ML; MG/100ML; MG/100ML
125 INJECTION, SOLUTION INTRAVENOUS CONTINUOUS
Status: DISCONTINUED | OUTPATIENT
Start: 2024-08-13 | End: 2024-08-13 | Stop reason: HOSPADM

## 2024-08-13 RX ORDER — ALBUTEROL SULFATE 0.83 MG/ML
2.5 SOLUTION RESPIRATORY (INHALATION) ONCE AS NEEDED
Status: DISCONTINUED | OUTPATIENT
Start: 2024-08-13 | End: 2024-08-13 | Stop reason: HOSPADM

## 2024-08-13 RX ADMIN — FENTANYL CITRATE 25 MCG: 50 INJECTION INTRAMUSCULAR; INTRAVENOUS at 16:05

## 2024-08-13 RX ADMIN — FENTANYL CITRATE 50 MCG: 50 INJECTION INTRAMUSCULAR; INTRAVENOUS at 15:24

## 2024-08-13 RX ADMIN — ONDANSETRON 4 MG: 2 INJECTION INTRAMUSCULAR; INTRAVENOUS at 15:20

## 2024-08-13 RX ADMIN — Medication 30 MG: at 15:20

## 2024-08-13 RX ADMIN — LIDOCAINE HYDROCHLORIDE 100 MG: 20 INJECTION, SOLUTION EPIDURAL; INFILTRATION; INTRACAUDAL; PERINEURAL at 15:17

## 2024-08-13 RX ADMIN — CIPROFLOXACIN 400 MG: 2 INJECTION, SOLUTION INTRAVENOUS at 14:56

## 2024-08-13 RX ADMIN — OXYCODONE HYDROCHLORIDE AND ACETAMINOPHEN 1 TABLET: 5; 325 TABLET ORAL at 16:33

## 2024-08-13 RX ADMIN — Medication 20 MG: at 15:16

## 2024-08-13 RX ADMIN — PROPOFOL 300 MG: 10 INJECTION, EMULSION INTRAVENOUS at 15:17

## 2024-08-13 RX ADMIN — MIDAZOLAM 4 MG: 1 INJECTION INTRAMUSCULAR; INTRAVENOUS at 15:11

## 2024-08-13 RX ADMIN — FENTANYL CITRATE 25 MCG: 50 INJECTION INTRAMUSCULAR; INTRAVENOUS at 16:00

## 2024-08-13 RX ADMIN — FENTANYL CITRATE 50 MCG: 50 INJECTION INTRAMUSCULAR; INTRAVENOUS at 15:20

## 2024-08-13 RX ADMIN — CIPROFLOXACIN: 2 INJECTION, SOLUTION INTRAVENOUS at 15:11

## 2024-08-13 RX ADMIN — DEXAMETHASONE SODIUM PHOSPHATE 10 MG: 10 INJECTION INTRAMUSCULAR; INTRAVENOUS at 15:20

## 2024-08-13 RX ADMIN — SODIUM CHLORIDE, SODIUM LACTATE, POTASSIUM CHLORIDE, AND CALCIUM CHLORIDE 125 ML/HR: .6; .31; .03; .02 INJECTION, SOLUTION INTRAVENOUS at 14:55

## 2024-08-13 NOTE — DISCHARGE SUMMARY
Discharge Summary - Corey Gilbert 33 y.o. male MRN: [id number]    Admission Date: 8/13/2024     Admitting Diagnosis: Penile wart [A63.0]    Procedures Performed: Excision and carbon dioxide laser treatment of penile condyloma    Patient underwent planned outpt surgery and recovered without complication. Discharged in good condition.  Medications were prescribed.  Pt knows to have office follow-up at the appropriate time.

## 2024-08-13 NOTE — QUICK NOTE
See op report.  Penile condyloma lesion excised and laser treated the base of it and edges.    He will have a large scab which can take several weeks to heal.  I instructed him to keep it dry for 2 days, then place Neosporin or similar on the lesion twice per day for 10 days.    Follow-up in 3 to 4 weeks.  It can take 8 weeks or so for the entire scab to heal.    This was a recurrent lesion in the same spot, we should see him back in 6 months after this

## 2024-08-13 NOTE — NURSING NOTE
Patient stated he had a ride home and will contact them to plan for discharge. Attempted to call ride to confirm, but unable to reach. Pt stated he contacted ride and they were almost at hospital to pick him up. Patient discharged from department escorted by PCA. Once at exit, ride not present. Patient refused to come back to same day surgery department and began walking away from hospital. Dr Hernandez and on call urology JOCELYN Cohen made aware in secure chat.

## 2024-08-13 NOTE — ANESTHESIA PREPROCEDURE EVALUATION
Procedure:  EXCISION CONDYLOMA PERINEAL (PENILE) W/ LASER CO2 (Perineum)    Relevant Problems   CARDIO   (+) Hypertension      PULMONARY   (+) Moderate asthma      Behavioral Health   (+) Tobacco use      Dermatology   (+) Penile wart        Physical Exam    Airway    Mallampati score: I  TM Distance: >3 FB  Neck ROM: full     Dental   No notable dental hx     Cardiovascular  Cardiovascular exam normal    Pulmonary  Pulmonary exam normal     Other Findings        Anesthesia Plan  ASA Score- 2     Anesthesia Type- general with ASA Monitors.         Additional Monitors:     Airway Plan: LMA.           Plan Factors-Exercise tolerance (METS): >4 METS.    Chart reviewed.   Existing labs reviewed. Patient summary reviewed.    Patient is a current smoker.  Patient instructed to abstain from smoking on day of procedure. Patient smoked on day of surgery.    Obstructive sleep apnea risk education given perioperatively.        Induction- intravenous.    Postoperative Plan- Plan for postoperative opioid use.     Perioperative Resuscitation Plan - Level 1 - Full Code.       Informed Consent- Anesthetic plan and risks discussed with patient.

## 2024-08-13 NOTE — OP NOTE
OPERATIVE REPORT  PATIENT NAME: Corey Gilbert    :  1990  MRN: [id number]  Pt Location: [facility] OR ROOM 06    SURGERY DATE: 2024    Surgeons and Role:     * Stephen Hernandez MD - Primary    Preop Diagnosis:  Penile wart [A63.0]    Post-Op Diagnosis Codes:     * Penile wart [A63.0]    Procedure(s):  EXCISION CONDYLOMA PERINEAL (PENILE) W/ LASER CO2    Specimen(s):  ID Type Source Tests Collected by Time Destination   1 : Condyloma Right side of Penis Tissue Penis TISSUE EXAM Stephen Hernandez MD 2024  3:41 PM        Estimated Blood Loss:   2 mL    Drains:  * No LDAs found *    Anesthesia Type:   General    Operative Indications:  Penile wart [A63.0]      Operative Findings:  5 x 8 mm penile wart on the right side of shaft      Complications:   None    Procedure and Technique:  After the patient was placed under adequate general anesthesia, he was prepped and draped using chlorhexidine solution in supine position.  1/2% Marcaine was instilled under the lesion for anesthetic.    The lesion was excised by retracting with scissors and cutting just underneath it.  All visible lesion was excised.    The carbon dioxide laser was then used to laser the edges of the lesion for about 3 mm, and the base of the lesion.  I took care not to penetrate the laser too deep, so it would not affect the penile structures or small nerves or blood vessels.    After thorough inspection, there was no bleeding.    Antibiotic ointment was placed.  A sterile dressing was placed, and wrapped with tape on the penis.  Patient taken to recovery in good condition.   I was present for the entire procedure.    Patient Disposition:  PACU         SIGNATURE: Stephen Hernandez MD  DATE: 2024  TIME: 3:53 PM

## 2024-08-13 NOTE — QUICK NOTE
Received message from PACU. Patient stated ride was on site and he was taken outside. When ride was not seen, OR staff attempted to direct patient back inside. Patient refused and proceeded to walk away from hospital. Attempt was made to call emergency contact, number is out of service. Dr. Hernandez is aware.       Verenice Cohen PA-C

## 2024-08-13 NOTE — ANESTHESIA POSTPROCEDURE EVALUATION
Post-Op Assessment Note    CV Status:  Stable    Pain management: adequate       Mental Status:  Alert and awake   Hydration Status:  Euvolemic   PONV Controlled:  Controlled   Airway Patency:  Patent     Post Op Vitals Reviewed: Yes    No anethesia notable event occurred.    Staff: Anesthesiologist               /75 (08/13/24 1605)    Temp      Pulse 80 (08/13/24 1605)   Resp 22 (08/13/24 1605)    SpO2 97 % (08/13/24 1605)

## 2024-08-13 NOTE — DISCHARGE INSTR - AVS FIRST PAGE
ALL YOUR  PREVIOUS MEDS ARE THE SAME.    NEW MEDS: Hydrocodone if needed for pain    Keep bandage dry until Thursday morning.  Just sponge bathe until then, no shower.    Remove the bandage Thursday morning, you can then shower and rub soap and water lightly over it.    Place Neosporin, or other similar over-the-counter antibiotic ointment, on the scab twice a day for 10 days.    You will have small amounts of bleeding from time to time, if so, hold pressure on it for 5 minutes.  Then put a Band-Aid snug over the scab.    ACTIVITY:  RESUME FULL ACTIVITY Friday, please be careful of the scab.

## 2024-08-14 ENCOUNTER — TELEPHONE (OUTPATIENT)
Dept: UROLOGY | Facility: MEDICAL CENTER | Age: 34
End: 2024-08-14

## 2024-08-14 NOTE — TELEPHONE ENCOUNTER
----- Message from Stephen Hernandez MD sent at 8/13/2024  3:56 PM EDT -----   Laser treatment of condyloma today and excision.  I gave him instructions to remove the bandage on Thursday morning, expect a large scab which he will put antibiotic cream on twice per day for 10 days.    He should see the AP in 3 to 4 weeks.

## 2024-08-14 NOTE — TELEPHONE ENCOUNTER
Called pt for RN post op assessment and left msg on VM to call office back.  When pt returns call please transfer to a nurse:    Post Op Note    Corey Gilbert is a 33 y.o. male s/p EXCISION CONDYLOMA PERINEAL (PENILE) W/ LASER CO2 (Perineum) performed 8/13/24 with Dr. Hernandez.      How would you rate your pain on a scale from 1 to 10, 10 being the worst pain ever?   Have you had a fever?   If so, what was your highest temperature? F What is your current temperature? F  Have your bowel movements been regular?   Do you have any difficulty urinating?   If the patient has an incision- do you have any redness around the incision or any drainage from the incision?     Do you have any other questions or concerns that I can address at this time?     He will have a large scab which can take several weeks to heal.  I instructed him to keep it dry for 2 days, then place Neosporin or similar on the lesion twice per day for 10 days.     Follow-up in 3 to 4 weeks.  It can take 8 weeks or so for the entire scab to heal.     This was a recurrent lesion in the same spot, we should see him back in 6 months after this            Electronically signed by Stephen Hernandez MD at 8/13/2024  3:56 PM    Pt is scheduled for fu appt on 8/30/24

## 2024-08-15 ENCOUNTER — TELEPHONE (OUTPATIENT)
Dept: UROLOGY | Facility: MEDICAL CENTER | Age: 34
End: 2024-08-15

## 2024-08-15 ENCOUNTER — TELEPHONE (OUTPATIENT)
Age: 34
End: 2024-08-15

## 2024-08-15 DIAGNOSIS — A63.0 ANOGENITAL WARTS: ICD-10-CM

## 2024-08-15 RX ORDER — OXYCODONE AND ACETAMINOPHEN 5; 325 MG/1; MG/1
1 TABLET ORAL EVERY 8 HOURS PRN
Qty: 5 TABLET | Refills: 0 | Status: SHIPPED | OUTPATIENT
Start: 2024-08-15 | End: 2024-08-17

## 2024-08-15 NOTE — TELEPHONE ENCOUNTER
Script recent. Only use for severe pain. Would try to transition to OTC analgesics after this. Please let patient know script was sent.

## 2024-08-15 NOTE — TELEPHONE ENCOUNTER
Post Op Note    Corey Gilbert is a 33 y.o. male s/p EXCISION CONDYLOMA PERINEAL (PENILE) W/ LASER CO2 (Perineum) performed 8/13/24 with Dr. Hernandez.     How would you rate your pain on a scale from 1 to 10, 10 being the worst pain ever? Unable to get a pain level but pt requested pain medication.  Percocet was called in  Have you had a fever? No  Have your bowel movements been regular? Yes  Do you have any difficulty urinating? No  If the patient has an incision- do you have any redness around the incision or any drainage from the incision? No  If the patient has a drain- are you having any issues with the drain? No  If the patient has a zafar- are you comfortable caring for your zafar? No zafar   Do you have any other questions or concerns that I can address at this time? Pt advised of the following.  Fu appt was confirmed    He will have a large scab which can take several weeks to heal.  I instructed him to keep it dry for 2 days, then place Neosporin or similar on the lesion twice per day for 10 days.     Follow-up in 3 to 4 weeks.  It can take 8 weeks or so for the entire scab to heal.     This was a recurrent lesion in the same spot, we should see him back in 6 months after this            Electronically signed by Stephen Hernandez MD at 8/13/2024  3:56 PM

## 2024-08-15 NOTE — TELEPHONE ENCOUNTER
2nd Attempt to reach pt    Called pt for RN post op assessment and left msg on VM to call office back.  When pt returns call please transfer to a nurse:     Post Op Note     Corey Gilbert is a 33 y.o. male s/p EXCISION CONDYLOMA PERINEAL (PENILE) W/ LASER CO2 (Perineum) performed 8/13/24 with Dr. Hernandez.       How would you rate your pain on a scale from 1 to 10, 10 being the worst pain ever?   Have you had a fever?   If so, what was your highest temperature? F What is your current temperature? F  Have your bowel movements been regular?   Do you have any difficulty urinating?   If the patient has an incision- do you have any redness around the incision or any drainage from the incision?      Do you have any other questions or concerns that I can address at this time?      He will have a large scab which can take several weeks to heal.  I instructed him to keep it dry for 2 days, then place Neosporin or similar on the lesion twice per day for 10 days.     Follow-up in 3 to 4 weeks.  It can take 8 weeks or so for the entire scab to heal.     This was a recurrent lesion in the same spot, we should see him back in 6 months after this            Electronically signed by Stephen Hernandez MD at 8/13/2024  3:56 PM     Pt is scheduled for fu appt on 8/30/24

## 2024-08-15 NOTE — TELEPHONE ENCOUNTER
Pt called and confirmed appt on 8/30/24.  It was very hard to hear the pt as there was a lot of back round noise.  Pt is requesting if another prescription can be sent for oxyCODONE 5-325 mg per tablet,to [facility] pharmacy for pain.    Seen message in chart from the RN regarding post op assessment but call dropped.    Pt call back: [phone]

## 2024-08-20 PROCEDURE — 88305 TISSUE EXAM BY PATHOLOGIST: CPT | Performed by: PATHOLOGY

## 2025-05-15 ENCOUNTER — OFFICE VISIT (OUTPATIENT)
Dept: DENTISTRY | Facility: CLINIC | Age: 35
End: 2025-05-15

## 2025-05-15 VITALS — DIASTOLIC BLOOD PRESSURE: 88 MMHG | SYSTOLIC BLOOD PRESSURE: 152 MMHG | HEART RATE: 62 BPM | TEMPERATURE: 98 F

## 2025-05-15 DIAGNOSIS — K04.5 SYMPTOMATIC APICAL PERIODONTITIS: Primary | ICD-10-CM

## 2025-05-15 PROCEDURE — D0270 BITEWING - SINGLE RADIOGRAPHIC IMAGE: HCPCS

## 2025-05-15 PROCEDURE — D0140 LIMITED ORAL EVALUATION - PROBLEM FOCUSED: HCPCS

## 2025-05-15 PROCEDURE — D0220 INTRAORAL - PERIAPICAL FIRST RADIOGRAPHIC IMAGE: HCPCS

## 2025-05-15 RX ORDER — CLINDAMYCIN HYDROCHLORIDE 300 MG/1
300 CAPSULE ORAL EVERY 8 HOURS
Qty: 15 CAPSULE | Refills: 0 | Status: SHIPPED | OUTPATIENT
Start: 2025-05-15 | End: 2025-05-20

## 2025-05-15 RX ORDER — AZITHROMYCIN 250 MG/1
500 TABLET, FILM COATED ORAL EVERY 24 HOURS
Qty: 14 TABLET | Refills: 0 | Status: CANCELLED | OUTPATIENT
Start: 2025-05-15 | End: 2025-05-22

## 2025-05-15 NOTE — PROGRESS NOTES
Limited Exam    Corey Gilbert 34 y.o. male presents with self to [facility] for Limited exam  PMH reviewed, no changes, ASA II. Significant medical history: HIV, Pt showed recent labs CD4 count as of 1/2025 is 900 and HIV viral load nondetectable. Significant allergies: red dye, amoxicillin. Significant medications: biktarvy.    Chief complaint:  UL pain    Consent:  Discussed that limited exam focuses on problem area, and same day tx is not guaranteed.  Patient explained to if they wish to have anything else evaluated, they need to return to the practice at which they are a patient of record or schedule a comprehensive exam afterwards.  Patient understands and consent was given by self via verbal consent.    Subjective history:    Onset: a few days ago.   Provocation: Spontaneous.   Quality: Sharp, Throbbing.   Region: UL.   Severity: 10/10.   Timing: lasts for hours.    Objective clinical findings:   Oral cancer screening: normal.   Extraoral exam: no remarkable findings.  Intraoral exam: significantly sized caries.     Radiographs: Select PA(s) - #12 and Single BW - 12.     Pulp testing:  #12 Cold: No response; Percussion: Severe tenderness; Palpation: Severe pain.    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  Rx: Azithromycin. Ensured with pharmacist no red dye in formulation.  Comprehensive care disposition: Patient expressed interest in becoming a patient of record with one of the  dental clinics.    Patient dismissed ambulatory and alert.    NV: comp exam after [facility]    Attending:   Susie checked radiographs.

## 2025-05-27 ENCOUNTER — TELEPHONE (OUTPATIENT)
Dept: DENTISTRY | Facility: CLINIC | Age: 35
End: 2025-05-27

## (undated) DEVICE — NEEDLE 18 G X 1 1/2

## (undated) DEVICE — SYRINGE 10ML LL

## (undated) DEVICE — PREMIUM DRY TRAY LF: Brand: MEDLINE INDUSTRIES, INC.

## (undated) DEVICE — SCD SEQUENTIAL COMPRESSION COMFORT SLEEVE MEDIUM KNEE LENGTH: Brand: KENDALL SCD

## (undated) DEVICE — EXIDINE 4 PCT

## (undated) DEVICE — NEEDLE 25G X 1 1/2

## (undated) DEVICE — SMOKE EVACUATION TUBING WITH 8 IN INTEGRAL WAND AND SPONGE GUARD: Brand: BUFFALO FILTER

## (undated) DEVICE — PLUMEPEN PRO 10FT

## (undated) DEVICE — DRESSING XEROFORM 5 X 9

## (undated) DEVICE — SKIN MARKER DUAL TIP WITH RULER CAP, FLEXIBLE RULER AND LABELS: Brand: DEVON

## (undated) DEVICE — INTENDED FOR TISSUE SEPARATION, AND OTHER PROCEDURES THAT REQUIRE A SHARP SURGICAL BLADE TO PUNCTURE OR CUT.: Brand: BARD-PARKER SAFETY BLADES SIZE 15, STERILE

## (undated) DEVICE — GAUZE SPONGES,16 PLY: Brand: CURITY

## (undated) DEVICE — OCCLUSIVE GAUZE STRIP,3% BISMUTH TRIBROMOPHENATE IN PETROLATUM BLEND: Brand: XEROFORM

## (undated) DEVICE — GLOVE SRG BIOGEL 7.5

## (undated) DEVICE — BETHLEHEM UNIVERSAL MINOR GEN: Brand: CARDINAL HEALTH

## (undated) DEVICE — KERLIX BANDAGE ROLL: Brand: KERLIX

## (undated) DEVICE — TUBING SUCTION 5MM X 12 FT

## (undated) DEVICE — SUT CHROMIC 4-0 SH 27 IN G121H

## (undated) DEVICE — FIBER LASER ENT ELEVATE ELITE

## (undated) DEVICE — FILTER SMOKE EVAC VIROSAFE